# Patient Record
Sex: MALE | Employment: FULL TIME | URBAN - METROPOLITAN AREA
[De-identification: names, ages, dates, MRNs, and addresses within clinical notes are randomized per-mention and may not be internally consistent; named-entity substitution may affect disease eponyms.]

---

## 2019-03-27 ENCOUNTER — OFFICE VISIT (OUTPATIENT)
Dept: PEDIATRICS | Facility: CLINIC | Age: 59
End: 2019-03-27
Payer: COMMERCIAL

## 2019-03-27 VITALS
DIASTOLIC BLOOD PRESSURE: 90 MMHG | SYSTOLIC BLOOD PRESSURE: 160 MMHG | WEIGHT: 199.7 LBS | OXYGEN SATURATION: 97 % | TEMPERATURE: 96.9 F | HEART RATE: 83 BPM

## 2019-03-27 DIAGNOSIS — G47.00 INSOMNIA, UNSPECIFIED TYPE: ICD-10-CM

## 2019-03-27 DIAGNOSIS — I25.10 CORONARY ARTERY DISEASE INVOLVING NATIVE CORONARY ARTERY OF NATIVE HEART WITHOUT ANGINA PECTORIS: ICD-10-CM

## 2019-03-27 DIAGNOSIS — I10 ESSENTIAL HYPERTENSION: Primary | ICD-10-CM

## 2019-03-27 PROCEDURE — 99203 OFFICE O/P NEW LOW 30 MIN: CPT | Performed by: FAMILY MEDICINE

## 2019-03-27 RX ORDER — ATORVASTATIN CALCIUM 40 MG/1
40 TABLET, FILM COATED ORAL DAILY
Qty: 90 TABLET | Refills: 1 | Status: SHIPPED | OUTPATIENT
Start: 2019-03-27 | End: 2022-02-24

## 2019-03-27 RX ORDER — HYDROXYZINE PAMOATE 50 MG/1
50 CAPSULE ORAL
Qty: 30 CAPSULE | Refills: 1 | Status: SHIPPED | OUTPATIENT
Start: 2019-03-27 | End: 2022-02-24

## 2019-03-27 RX ORDER — METOPROLOL SUCCINATE 25 MG/1
TABLET, EXTENDED RELEASE ORAL
Qty: 270 TABLET | Refills: 1 | Status: SHIPPED | OUTPATIENT
Start: 2019-03-27 | End: 2020-12-31

## 2019-03-27 RX ORDER — AMLODIPINE BESYLATE 10 MG/1
10 TABLET ORAL DAILY
Qty: 90 TABLET | Refills: 1 | Status: SHIPPED | OUTPATIENT
Start: 2019-03-27 | End: 2022-02-24

## 2019-03-27 RX ORDER — HYDROCHLOROTHIAZIDE 50 MG/1
25 TABLET ORAL DAILY
Qty: 45 TABLET | Refills: 1 | Status: SHIPPED | OUTPATIENT
Start: 2019-03-27 | End: 2020-12-31

## 2019-03-27 NOTE — PROGRESS NOTES
CHIEF COMPLAINT    Needs refills.  H/O high BP      HISTORY    He used to be with [facility].    He has h/o BP, CAD - stent in LAD. Used to smoke.    He is feeling weaker and has higher BP in afternoons.    I went over his meds. Reviewed Care everywhere.      Patient Active Problem List   Diagnosis     Essential hypertension     Coronary artery disease involving native coronary artery of native heart without angina pectoris         Current Outpatient Medications   Medication Sig Dispense Refill     amLODIPine (NORVASC) 10 MG tablet Take 1 tablet (10 mg) by mouth daily 90 tablet 1     ASPIRIN 81 PO Take by mouth daily       atorvastatin (LIPITOR) 40 MG tablet Take 1 tablet (40 mg) by mouth daily 90 tablet 1     hydrochlorothiazide (HYDRODIURIL) 50 MG tablet Take 0.5 tablets (25 mg) by mouth daily 45 tablet 1     hydrOXYzine (VISTARIL) 50 MG capsule Take 1 capsule (50 mg) by mouth every evening as needed (sleep) 30 capsule 1     metoprolol succinate ER (TOPROL-XL) 25 MG 24 hr tablet Two in morning and 1 in evening. 270 tablet 1         REVIEW OF SYSTEMS    No angina or edema. No palpitation.  No SOB  No abd pain  Insomnia        History reviewed. No pertinent past medical history.      EXAM  /90 (BP Location: Right arm, Patient Position: Chair, Cuff Size: Adult Regular)   Pulse 83   Temp 96.9  F (36.1  C) (Tympanic)   Wt 90.6 kg (199 lb 11.2 oz)   SpO2 97%     Thin, NAD  HEENT neg  Neck no mass  Chest breathing non labored  CVC RSR  Abd non distended  Extrems no edema      (I10) Essential hypertension  (primary encounter diagnosis)  Comment:   Plan: hydrochlorothiazide (HYDRODIURIL) 50 MG tablet,        amLODIPine (NORVASC) 10 MG tablet, metoprolol         succinate ER (TOPROL-XL) 25 MG 24 hr tablet          Increased the AM Metoprolol.  F/U advised. Lab.    (I25.10) Coronary artery disease involving native coronary artery of native heart without angina pectoris  Comment:   Plan: atorvastatin (LIPITOR) 40  MG tablet            (G47.00) Insomnia, unspecified type  Comment:   Plan: hydrOXYzine (VISTARIL) 50 MG capsule

## 2020-12-15 ENCOUNTER — TRANSFERRED RECORDS (OUTPATIENT)
Dept: HEALTH INFORMATION MANAGEMENT | Facility: CLINIC | Age: 60
End: 2020-12-15

## 2020-12-31 ENCOUNTER — OFFICE VISIT (OUTPATIENT)
Dept: CARDIOLOGY | Facility: CLINIC | Age: 60
End: 2020-12-31
Payer: COMMERCIAL

## 2020-12-31 VITALS
SYSTOLIC BLOOD PRESSURE: 129 MMHG | HEART RATE: 88 BPM | WEIGHT: 186 LBS | DIASTOLIC BLOOD PRESSURE: 89 MMHG | HEIGHT: 74 IN | BODY MASS INDEX: 23.87 KG/M2

## 2020-12-31 DIAGNOSIS — I25.10 CORONARY ARTERY DISEASE INVOLVING NATIVE CORONARY ARTERY OF NATIVE HEART WITHOUT ANGINA PECTORIS: ICD-10-CM

## 2020-12-31 DIAGNOSIS — I10 ESSENTIAL HYPERTENSION: Primary | ICD-10-CM

## 2020-12-31 DIAGNOSIS — R55 VASOVAGAL SYNCOPE: ICD-10-CM

## 2020-12-31 DIAGNOSIS — E78.5 HYPERLIPIDEMIA LDL GOAL <70: ICD-10-CM

## 2020-12-31 PROCEDURE — 99204 OFFICE O/P NEW MOD 45 MIN: CPT | Performed by: INTERNAL MEDICINE

## 2020-12-31 PROCEDURE — 93000 ELECTROCARDIOGRAM COMPLETE: CPT | Performed by: INTERNAL MEDICINE

## 2020-12-31 RX ORDER — ESCITALOPRAM OXALATE 20 MG/1
20 TABLET ORAL DAILY
COMMUNITY
End: 2022-02-24

## 2020-12-31 RX ORDER — TRAZODONE HYDROCHLORIDE 150 MG/1
150 TABLET ORAL AT BEDTIME
COMMUNITY
End: 2022-02-24

## 2020-12-31 RX ORDER — NALTREXONE HYDROCHLORIDE 50 MG/1
50 TABLET, FILM COATED ORAL DAILY
COMMUNITY
End: 2022-02-24

## 2020-12-31 RX ORDER — CARVEDILOL 12.5 MG/1
12.5 TABLET ORAL 2 TIMES DAILY WITH MEALS
Qty: 60 TABLET | Refills: 11 | Status: SHIPPED | OUTPATIENT
Start: 2020-12-31 | End: 2022-02-24

## 2020-12-31 RX ORDER — MAGNESIUM 200 MG
1000 TABLET ORAL DAILY
COMMUNITY
End: 2022-02-24

## 2020-12-31 ASSESSMENT — MIFFLIN-ST. JEOR: SCORE: 1713.44

## 2020-12-31 NOTE — PROGRESS NOTES
HPI and Plan:   I had the pleasure of seeing Roman Corona in cardiology clinic for consultation regarding his hypertension.    He is originally from [place].  He has history of coronary artery disease.  He had a stent to the mid LAD in 2015 at [facility].  At that he had a small OM branch which had an 80% stenosis which was managed medically.  Prior to that, he had abnormal stress test with LAD ischemia.  Since then, he has been free of chest pain.  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    He made this appointment because of elevated blood pressures that have been noted over the last 2 months at home.  He has a home blood pressure instrument which he thinks is accurate.  It has been demonstrating high blood pressure in 1 6170 systolic in the morning and at night time.  He is already on metoprolol XL 50 mg twice daily.  This was increased recently from 50 mg in the morning and 25 g in the evening.  He is also on losartan hydrochlorothiazide 50/12.5 mg daily and amlodipine 10 mg daily.  He claims to be following a low-salt diet.  He also participates in physical activity including walking and has had no chest pain or shortness of breath.  Over the last 2 months, he is also had episodes of lightheadedness and occasional episodes of passing out.  It occurs when he is upright and he feels dizzy and sometimes he can able to abort these episodes by sitting down but on occasions he has passed out briefly.  He has taken blood pressures after passing out and blood pressures are actually higher.    He remains on a statin.  His primary care physician did some labs recently but not BMP or lipid profile.  ALT AST was normal.  He has not had any cardiac evaluation since 2015.    EKG done today reveals sinus rhythm without any ischemic changes.    Physical exam  See below    Impression    1.  Hypertension  Patient has been noticing elevated blood pressures  at home.  Here blood pressure is borderline with diastolic of 89 and systolic 129.  However, his home blood pressure instrument has noted high readings including this morning before he took his pills.  I suggested that which switch his metoprolol to Coreg 12.5 mg twice daily it is may be better blood pressure lowering agent as it also has alpha blocking action.  I have also asked him to come back and visit with my midlevel provider so that we can recheck his blood pressure and also check his blood pressure machine whether it is accurate or not.  He will bring it along with him.  In addition, I have asked him to continue to follow a low-salt diet.    2.  Coronary artery disease with previous mid LAD stent  Continue aspirin.  I offered him stress test to reassess ischemic burden of myocardium especially given episodes of dizziness and syncope.  He had a bad experience with stress test in the past and is categorically declining it.  I offered him a pharmacological stress test as his experience was bad with this exercise stress test but he still declines it.  He has agreed to do an echocardiogram to assess wall motion and ejection fraction.    3.  Syncope  This could be vasovagal.  If it persists, we may consider doing an Zio patch monitor to make sure there is no arrhythmia.  He has not complained of any palpitations.  I would recommend checking his orthostasis at the next visit.  I wonder whether mild antihypertensives is causing this lightheadedness.  Again, accuracy of his home blood pressure instrument needs to be confirmed.    4.  Hyperlipidemia, continue atorvastatin.  Will check lipid profile at next visit when he sees my midlevel provider    He lives in [place] and will see our midlevel provider in [facility].  However, subsequently wants to see her cardiologist that visits at the [place] office and I would suggest that he make an appointment Dr. Brown or Dr. Dodson at that time at the [place] office.    Thank  you for allowing us to participate in the care of this nice patient.    Sincerely,    Abdulaziz Diane MD      Orders Placed This Encounter   Procedures     Basic metabolic panel     Lipid Profile     ALT     Follow-Up with Cardiac Advanced Practice Provider     EKG 12-lead complete w/read - Clinics (performed today)     Echocardiogram Complete       Orders Placed This Encounter   Medications     cyanocobalamin (VITAMIN B-12) 1000 MCG SUBL sublingual tablet     Sig: Place 1,000 mcg under the tongue daily     escitalopram (LEXAPRO) 20 MG tablet     Sig: Take 20 mg by mouth daily     losartan 50 MG TABS 50 mg, hydrochlorothiazide 12.5 MG CAPS 12.5 mg     Sig: Take by mouth daily     naltrexone (DEPADE/REVIA) 50 MG tablet     Sig: Take 50 mg by mouth daily     traZODone (DESYREL) 150 MG tablet     Sig: Take 150 mg by mouth At Bedtime PRN     carvedilol (COREG) 12.5 MG tablet     Sig: Take 1 tablet (12.5 mg) by mouth 2 times daily (with meals)     Dispense:  60 tablet     Refill:  11       Medications Discontinued During This Encounter   Medication Reason     hydrochlorothiazide (HYDRODIURIL) 50 MG tablet Stopped by Patient     metoprolol succinate ER (TOPROL-XL) 25 MG 24 hr tablet          Encounter Diagnoses   Name Primary?     Essential hypertension Yes     Coronary artery disease involving native coronary artery of native heart without angina pectoris      Hyperlipidemia LDL goal <70      Vasovagal syncope        CURRENT MEDICATIONS:  Current Outpatient Medications   Medication Sig Dispense Refill     amLODIPine (NORVASC) 10 MG tablet Take 1 tablet (10 mg) by mouth daily 90 tablet 1     ASPIRIN 81 PO Take by mouth daily       atorvastatin (LIPITOR) 40 MG tablet Take 1 tablet (40 mg) by mouth daily 90 tablet 1     carvedilol (COREG) 12.5 MG tablet Take 1 tablet (12.5 mg) by mouth 2 times daily (with meals) 60 tablet 11     cyanocobalamin (VITAMIN B-12) 1000 MCG SUBL sublingual tablet Place 1,000 mcg under the tongue  daily       escitalopram (LEXAPRO) 20 MG tablet Take 20 mg by mouth daily       losartan 50 MG TABS 50 mg, hydrochlorothiazide 12.5 MG CAPS 12.5 mg Take by mouth daily       naltrexone (DEPADE/REVIA) 50 MG tablet Take 50 mg by mouth daily       traZODone (DESYREL) 150 MG tablet Take 150 mg by mouth At Bedtime PRN       hydrOXYzine (VISTARIL) 50 MG capsule Take 1 capsule (50 mg) by mouth every evening as needed (sleep) (Patient not taking: Reported on 12/31/2020) 30 capsule 1       ALLERGIES     Allergies   Allergen Reactions     Ace Inhibitors      rash     Melatonin      Shortness of breath       PAST MEDICAL HISTORY:  Past Medical History:   Diagnosis Date     Depression      ETOH abuse      Hyperlipidemia LDL goal <70        PAST SURGICAL HISTORY:  Past Surgical History:   Procedure Laterality Date     ANGIOPLASTY  03/30/2015    MID LAD stent       FAMILY HISTORY:  Family History   Problem Relation Age of Onset     Hypertension Mother        SOCIAL HISTORY:  Social History     Socioeconomic History     Marital status:      Spouse name: None     Number of children: None     Years of education: None     Highest education level: None   Occupational History     None   Social Needs     Financial resource strain: None     Food insecurity     Worry: None     Inability: None     Transportation needs     Medical: None     Non-medical: None   Tobacco Use     Smoking status: Current Every Day Smoker     Years: 28.00     Types: Cigarettes     Smokeless tobacco: Never Used     Tobacco comment: smokes 3-4 cig day,   Substance and Sexual Activity     Alcohol use: Not Currently     Drug use: None     Sexual activity: None   Lifestyle     Physical activity     Days per week: None     Minutes per session: None     Stress: None   Relationships     Social connections     Talks on phone: None     Gets together: None     Attends Restorationist service: None     Active member of club or organization: None     Attends meetings of  "clubs or organizations: None     Relationship status: None     Intimate partner violence     Fear of current or ex partner: None     Emotionally abused: None     Physically abused: None     Forced sexual activity: None   Other Topics Concern     Parent/sibling w/ CABG, MI or angioplasty before 65F 55M? Not Asked   Social History Narrative     None       Review of Systems:  Skin:  Negative       Eyes:  Positive for glasses;visual blurring    ENT:  Negative      Respiratory:  Positive for dyspnea on exertion     Cardiovascular:  Negative;chest pain Positive for;dizziness;palpitations;lower extremity symptoms;fatigue;exercise intolerance near syncope, edema in feet  Gastroenterology: Negative      Genitourinary:  Negative      Musculoskeletal:  Positive for   balance off, recent falls  Neurologic:  Positive for numbness or tingling of feet;headaches    Psychiatric:  Positive for sleep disturbances    Heme/Lymph/Imm:  Positive for weight loss    Endocrine:  Negative        Physical Exam:  Vitals: /89   Pulse 88   Ht 1.88 m (6' 2\")   Wt 84.4 kg (186 lb)   BMI 23.88 kg/m      Constitutional:  in no acute distress        Skin:  warm and dry to the touch          Head:  normocephalic        Eyes:  sclera white        Lymph:      ENT:  no pallor or cyanosis        Neck:  JVP normal        Respiratory:  clear to auscultation         Cardiac: regular rhythm;normal S1 and S2     no presence of murmur        S4+                                           GI:  not assessed this visit        Extremities and Muscular Skeletal:  no edema              Neurological:  no gross motor deficits        Psych:  Alert and Oriented x 3        CC  No referring provider defined for this encounter.              "

## 2022-02-24 ENCOUNTER — OFFICE VISIT (OUTPATIENT)
Dept: INTERNAL MEDICINE | Facility: CLINIC | Age: 62
End: 2022-02-24
Payer: COMMERCIAL

## 2022-02-24 ENCOUNTER — ANCILLARY PROCEDURE (OUTPATIENT)
Dept: GENERAL RADIOLOGY | Facility: CLINIC | Age: 62
End: 2022-02-24
Attending: FAMILY MEDICINE
Payer: COMMERCIAL

## 2022-02-24 VITALS
BODY MASS INDEX: 24.38 KG/M2 | HEIGHT: 74 IN | SYSTOLIC BLOOD PRESSURE: 122 MMHG | HEART RATE: 108 BPM | DIASTOLIC BLOOD PRESSURE: 96 MMHG | WEIGHT: 190 LBS | OXYGEN SATURATION: 100 %

## 2022-02-24 DIAGNOSIS — I25.10 CORONARY ARTERY DISEASE INVOLVING NATIVE CORONARY ARTERY OF NATIVE HEART WITHOUT ANGINA PECTORIS: ICD-10-CM

## 2022-02-24 DIAGNOSIS — R42 DIZZINESS: ICD-10-CM

## 2022-02-24 DIAGNOSIS — I10 ESSENTIAL HYPERTENSION: ICD-10-CM

## 2022-02-24 DIAGNOSIS — R05.9 COUGH: ICD-10-CM

## 2022-02-24 DIAGNOSIS — R53.1 WEAKNESS: Primary | ICD-10-CM

## 2022-02-24 DIAGNOSIS — F51.01 PRIMARY INSOMNIA: ICD-10-CM

## 2022-02-24 DIAGNOSIS — F17.200 TOBACCO USE DISORDER: ICD-10-CM

## 2022-02-24 LAB
BASOPHILS # BLD AUTO: 0 10E3/UL (ref 0–0.2)
BASOPHILS NFR BLD AUTO: 0 %
EOSINOPHIL # BLD AUTO: 0.1 10E3/UL (ref 0–0.7)
EOSINOPHIL NFR BLD AUTO: 1 %
ERYTHROCYTE [DISTWIDTH] IN BLOOD BY AUTOMATED COUNT: 14 % (ref 10–15)
HCT VFR BLD AUTO: 41.1 % (ref 40–53)
HGB BLD-MCNC: 14.3 G/DL (ref 13.3–17.7)
LYMPHOCYTES # BLD AUTO: 1.6 10E3/UL (ref 0.8–5.3)
LYMPHOCYTES NFR BLD AUTO: 30 %
MCH RBC QN AUTO: 32.6 PG (ref 26.5–33)
MCHC RBC AUTO-ENTMCNC: 34.8 G/DL (ref 31.5–36.5)
MCV RBC AUTO: 94 FL (ref 78–100)
MONOCYTES # BLD AUTO: 0.9 10E3/UL (ref 0–1.3)
MONOCYTES NFR BLD AUTO: 17 %
NEUTROPHILS # BLD AUTO: 2.8 10E3/UL (ref 1.6–8.3)
NEUTROPHILS NFR BLD AUTO: 52 %
PLATELET # BLD AUTO: 206 10E3/UL (ref 150–450)
RBC # BLD AUTO: 4.38 10E6/UL (ref 4.4–5.9)
WBC # BLD AUTO: 5.4 10E3/UL (ref 4–11)

## 2022-02-24 PROCEDURE — 80050 GENERAL HEALTH PANEL: CPT | Performed by: FAMILY MEDICINE

## 2022-02-24 PROCEDURE — 36415 COLL VENOUS BLD VENIPUNCTURE: CPT | Performed by: FAMILY MEDICINE

## 2022-02-24 PROCEDURE — 80061 LIPID PANEL: CPT | Performed by: FAMILY MEDICINE

## 2022-02-24 PROCEDURE — 71046 X-RAY EXAM CHEST 2 VIEWS: CPT | Performed by: RADIOLOGY

## 2022-02-24 PROCEDURE — 99214 OFFICE O/P EST MOD 30 MIN: CPT | Performed by: FAMILY MEDICINE

## 2022-02-24 RX ORDER — TRAZODONE HYDROCHLORIDE 50 MG/1
50 TABLET, FILM COATED ORAL AT BEDTIME
Qty: 90 TABLET | Refills: 1 | Status: SHIPPED | OUTPATIENT
Start: 2022-02-24 | End: 2022-03-10

## 2022-02-24 RX ORDER — AMLODIPINE BESYLATE 10 MG/1
10 TABLET ORAL DAILY
Qty: 90 TABLET | Refills: 1 | Status: SHIPPED | OUTPATIENT
Start: 2022-02-24

## 2022-02-24 RX ORDER — METOPROLOL SUCCINATE 25 MG/1
25 TABLET, EXTENDED RELEASE ORAL DAILY
Status: CANCELLED | COMMUNITY
Start: 2022-02-24

## 2022-02-24 RX ORDER — HYDROCHLOROTHIAZIDE 50 MG/1
100 TABLET ORAL EVERY OTHER DAY
Status: CANCELLED | OUTPATIENT
Start: 2022-02-24

## 2022-02-24 RX ORDER — ATORVASTATIN CALCIUM 40 MG/1
40 TABLET, FILM COATED ORAL DAILY
Qty: 90 TABLET | Refills: 1 | Status: SHIPPED | OUTPATIENT
Start: 2022-02-24

## 2022-02-24 RX ORDER — METOPROLOL SUCCINATE 25 MG/1
TABLET, EXTENDED RELEASE ORAL
Qty: 270 TABLET | Refills: 1 | Status: SHIPPED | OUTPATIENT
Start: 2022-02-24

## 2022-02-24 NOTE — PROGRESS NOTES
Son  [phone]      (R53.1) Weakness  (primary encounter diagnosis)  Comment:     Suspicion that much of this is alcohol related.    Plan: Comprehensive metabolic panel (BMP + Alb, Alk         Phos, ALT, AST, Total. Bili, TP), CBC with         platelets and differential, TSH with free T4         reflex        Patient feels he can abstain.    (R42) Dizziness  Comment:   Plan: Comprehensive metabolic panel (BMP + Alb, Alk         Phos, ALT, AST, Total. Bili, TP), TSH with free        T4 reflex            (R05.9) Cough  Comment:   Plan: XR Chest 2 Views            (I10) Essential hypertension  Comment:   Plan: amLODIPine (NORVASC) 10 MG tablet, metoprolol         succinate ER (TOPROL-XL) 25 MG 24 hr tablet            (I25.10) Coronary artery disease involving native coronary artery of native heart without angina pectoris  Comment:   Plan: Lipid panel reflex to direct LDL Non-fasting,         atorvastatin (LIPITOR) 40 MG tablet            (F17.200) Tobacco use disorder  Comment:   Plan:     (F51.01) Primary insomnia  Comment:   Plan: traZODone (DESYREL) 50 MG tablet                  Subjective     61-year-old man here with his son.  Concerns.    HPI     Generalized body weakness, dizziness, insomnia, nausea and weight loss started 5-2021.    Complains of some symptoms dating back to May 2021.  Complains of weakness and dizziness.  At times confused.  Has decreased appetite.    Patient apparently drinks alcohol, at least 5 drinks per day and son indicates that he probably drinks more.  He currently has been abstaining for 3 to 4 days and thinks he can keep this up.    Apparently lives by himself.    Has history of CAD, patient is a smoker.  Has hypertension.    Needs some refills.      Current Outpatient Medications   Medication Sig Dispense Refill     amLODIPine (NORVASC) 10 MG tablet Take 1 tablet (10 mg) by mouth daily 90 tablet 1     ASPIRIN 81 PO Take by mouth daily       atorvastatin (LIPITOR) 40 MG tablet  "Take 1 tablet (40 mg) by mouth daily 90 tablet 1     losartan 50 MG TABS 50 mg, hydrochlorothiazide 12.5 MG CAPS 12.5 mg Take by mouth daily       metoprolol succinate ER (TOPROL-XL) 25 MG 24 hr tablet 2 in AM and 1 in  tablet 1     traZODone (DESYREL) 50 MG tablet Take 1 tablet (50 mg) by mouth At Bedtime 90 tablet 1       Patient Active Problem List   Diagnosis     Essential hypertension     Coronary artery disease involving native coronary artery of native heart without angina pectoris     Hyperlipidemia LDL goal <70     Vasovagal syncope     Tobacco use disorder         Review of Systems     No fever or malaise.  No chest pain.  No syncope.  No vomiting or diarrhea.  No abdominal pain.  No extremity edema.  No focal weakness.  No tremor.  No jaundice.          Objective    BP (!) 122/96 (BP Location: Right arm, Patient Position: Chair, Cuff Size: Adult Large)   Pulse 108   Ht 1.88 m (6' 2\")   Wt 86.2 kg (190 lb)   SpO2 100%   BMI 24.39 kg/m    There is no height or weight on file to calculate BMI.  Physical Exam     Alert.  Appears a bit older than stated age.  HEENT no scleral icterus.  No neck masses or thyromegaly.  Lungs occasional rhonchi.  No wheezing.  No labored breathing.  Cardiac RSR without murmur.  Abdomen no mass or tenderness.  Extremities no edema.  No apparent motor weakness.  No tremor.    "

## 2022-02-24 NOTE — LETTER
March 9, 2022      Arash Corona  [address]  [place] [address]        Mateus Michelgey,     Your labs show some electrolyte problems with sodium and potassium and you have mildly abnormal liver function.     If you stayed away from alcohol and been eating more normally?     We were planning a return visit.  Please have another appointment to see how you are doing.            Sincerely,      Reese Kern MD      Results for orders placed or performed in visit on 02/24/22   XR Chest 2 Views     Status: None    Narrative    CHEST TWO VIEWS 2/24/2022 4:37 PM     HISTORY: Cough    COMPARISON: None.    FINDINGS: Heart size and pulmonary vascularity are within normal  limits. The lungs are clear. No pneumothorax or pleural effusion.       Impression    IMPRESSION: No radiographic evidence of acute chest abnormality.     LD BRADLEY MD         SYSTEM ID:  [id number]   Results for orders placed or performed in visit on 02/24/22   Comprehensive metabolic panel (BMP + Alb, Alk Phos, ALT, AST, Total. Bili, TP)     Status: Abnormal   Result Value Ref Range    Sodium 130 (L) 133 - 144 mmol/L    Potassium 2.9 (L) 3.4 - 5.3 mmol/L    Chloride 88 (L) 94 - 109 mmol/L    Carbon Dioxide (CO2) 30 20 - 32 mmol/L    Anion Gap 12 3 - 14 mmol/L    Urea Nitrogen 8 7 - 30 mg/dL    Creatinine 1.04 0.66 - 1.25 mg/dL    Calcium 9.3 8.5 - 10.1 mg/dL    Glucose 139 (H) 70 - 99 mg/dL    Alkaline Phosphatase 58 40 - 150 U/L    AST 60 (H) 0 - 45 U/L    ALT 54 0 - 70 U/L    Protein Total 7.3 6.8 - 8.8 g/dL    Albumin 3.0 (L) 3.4 - 5.0 g/dL    Bilirubin Total 0.4 0.2 - 1.3 mg/dL    GFR Estimate 81 >60 mL/min/1.73m2   TSH with free T4 reflex     Status: Normal   Result Value Ref Range    TSH 1.52 0.40 - 4.00 mU/L   Lipid panel reflex to direct LDL Non-fasting     Status: None   Result Value Ref Range    Cholesterol 184 <200 mg/dL    Triglycerides 104 <150 mg/dL    Direct Measure HDL 66 >=40 mg/dL    LDL Cholesterol Calculated 97 <=100  mg/dL    Non HDL Cholesterol 118 <130 mg/dL    Patient Fasting > 8hrs? No     Narrative    Cholesterol  Desirable:  <200 mg/dL    Triglycerides  Normal:  Less than 150 mg/dL  Borderline High:  150-199 mg/dL  High:  200-499 mg/dL  Very High:  Greater than or equal to 500 mg/dL    Direct Measure HDL  Female:  Greater than or equal to 50 mg/dL   Male:  Greater than or equal to 40 mg/dL    LDL Cholesterol  Desirable:  <100mg/dL  Above Desirable:  100-129 mg/dL   Borderline High:  130-159 mg/dL   High:  160-189 mg/dL   Very High:  >= 190 mg/dL    Non HDL Cholesterol  Desirable:  130 mg/dL  Above Desirable:  130-159 mg/dL  Borderline High:  160-189 mg/dL  High:  190-219 mg/dL  Very High:  Greater than or equal to 220 mg/dL   CBC with platelets and differential     Status: Abnormal   Result Value Ref Range    WBC Count 5.4 4.0 - 11.0 10e3/uL    RBC Count 4.38 (L) 4.40 - 5.90 10e6/uL    Hemoglobin 14.3 13.3 - 17.7 g/dL    Hematocrit 41.1 40.0 - 53.0 %    MCV 94 78 - 100 fL    MCH 32.6 26.5 - 33.0 pg    MCHC 34.8 31.5 - 36.5 g/dL    RDW 14.0 10.0 - 15.0 %    Platelet Count 206 150 - 450 10e3/uL    % Neutrophils 52 %    % Lymphocytes 30 %    % Monocytes 17 %    % Eosinophils 1 %    % Basophils 0 %    Absolute Neutrophils 2.8 1.6 - 8.3 10e3/uL    Absolute Lymphocytes 1.6 0.8 - 5.3 10e3/uL    Absolute Monocytes 0.9 0.0 - 1.3 10e3/uL    Absolute Eosinophils 0.1 0.0 - 0.7 10e3/uL    Absolute Basophils 0.0 0.0 - 0.2 10e3/uL   CBC with platelets and differential     Status: Abnormal    Narrative    The following orders were created for panel order CBC with platelets and differential.  Procedure                               Abnormality         Status                     ---------                               -----------         ------                     CBC with platelets and d...[[id number]]  Abnormal            Final result                 Please view results for these tests on the individual orders.

## 2022-02-24 NOTE — PATIENT INSTRUCTIONS
Stop alcohol.    Eat regularly.    Take multi vitamin.      See Dr Kern in 2 weeks for re check.

## 2022-02-26 LAB
ALBUMIN SERPL-MCNC: 3 G/DL (ref 3.4–5)
ALP SERPL-CCNC: 58 U/L (ref 40–150)
ALT SERPL W P-5'-P-CCNC: 54 U/L (ref 0–70)
ANION GAP SERPL CALCULATED.3IONS-SCNC: 12 MMOL/L (ref 3–14)
AST SERPL W P-5'-P-CCNC: 60 U/L (ref 0–45)
BILIRUB SERPL-MCNC: 0.4 MG/DL (ref 0.2–1.3)
BUN SERPL-MCNC: 8 MG/DL (ref 7–30)
CALCIUM SERPL-MCNC: 9.3 MG/DL (ref 8.5–10.1)
CHLORIDE BLD-SCNC: 88 MMOL/L (ref 94–109)
CHOLEST SERPL-MCNC: 184 MG/DL
CO2 SERPL-SCNC: 30 MMOL/L (ref 20–32)
CREAT SERPL-MCNC: 1.04 MG/DL (ref 0.66–1.25)
FASTING STATUS PATIENT QL REPORTED: NO
GFR SERPL CREATININE-BSD FRML MDRD: 81 ML/MIN/1.73M2
GLUCOSE BLD-MCNC: 139 MG/DL (ref 70–99)
HDLC SERPL-MCNC: 66 MG/DL
LDLC SERPL CALC-MCNC: 97 MG/DL
NONHDLC SERPL-MCNC: 118 MG/DL
POTASSIUM BLD-SCNC: 2.9 MMOL/L (ref 3.4–5.3)
PROT SERPL-MCNC: 7.3 G/DL (ref 6.8–8.8)
SODIUM SERPL-SCNC: 130 MMOL/L (ref 133–144)
TRIGL SERPL-MCNC: 104 MG/DL
TSH SERPL DL<=0.005 MIU/L-ACNC: 1.52 MU/L (ref 0.4–4)

## 2022-03-10 ENCOUNTER — OFFICE VISIT (OUTPATIENT)
Dept: INTERNAL MEDICINE | Facility: CLINIC | Age: 62
End: 2022-03-10
Payer: COMMERCIAL

## 2022-03-10 VITALS
RESPIRATION RATE: 18 BRPM | SYSTOLIC BLOOD PRESSURE: 129 MMHG | HEART RATE: 99 BPM | TEMPERATURE: 97.2 F | DIASTOLIC BLOOD PRESSURE: 91 MMHG | HEIGHT: 74 IN | BODY MASS INDEX: 25.41 KG/M2 | OXYGEN SATURATION: 98 % | WEIGHT: 198 LBS

## 2022-03-10 DIAGNOSIS — E87.6 HYPOKALEMIA: ICD-10-CM

## 2022-03-10 DIAGNOSIS — I10 ESSENTIAL HYPERTENSION: ICD-10-CM

## 2022-03-10 DIAGNOSIS — R42 VERTIGO: Primary | ICD-10-CM

## 2022-03-10 DIAGNOSIS — F51.01 PRIMARY INSOMNIA: ICD-10-CM

## 2022-03-10 DIAGNOSIS — G47.00 INSOMNIA, UNSPECIFIED TYPE: ICD-10-CM

## 2022-03-10 DIAGNOSIS — E87.1 HYPONATREMIA: ICD-10-CM

## 2022-03-10 LAB
ANION GAP SERPL CALCULATED.3IONS-SCNC: 4 MMOL/L (ref 3–14)
BUN SERPL-MCNC: 24 MG/DL (ref 7–30)
CALCIUM SERPL-MCNC: 9.2 MG/DL (ref 8.5–10.1)
CHLORIDE BLD-SCNC: 107 MMOL/L (ref 94–109)
CO2 SERPL-SCNC: 28 MMOL/L (ref 20–32)
CREAT SERPL-MCNC: 1.01 MG/DL (ref 0.66–1.25)
GFR SERPL CREATININE-BSD FRML MDRD: 84 ML/MIN/1.73M2
GLUCOSE BLD-MCNC: 91 MG/DL (ref 70–99)
POTASSIUM BLD-SCNC: 3.9 MMOL/L (ref 3.4–5.3)
SODIUM SERPL-SCNC: 139 MMOL/L (ref 133–144)

## 2022-03-10 PROCEDURE — 36415 COLL VENOUS BLD VENIPUNCTURE: CPT | Performed by: FAMILY MEDICINE

## 2022-03-10 PROCEDURE — 80048 BASIC METABOLIC PNL TOTAL CA: CPT | Performed by: FAMILY MEDICINE

## 2022-03-10 PROCEDURE — 99214 OFFICE O/P EST MOD 30 MIN: CPT | Performed by: FAMILY MEDICINE

## 2022-03-10 RX ORDER — POTASSIUM CHLORIDE 1500 MG/1
20 TABLET, EXTENDED RELEASE ORAL DAILY
Qty: 30 TABLET | Refills: 2 | Status: SHIPPED | OUTPATIENT
Start: 2022-03-10 | End: 2022-07-03

## 2022-03-10 RX ORDER — CLONAZEPAM 1 MG/1
1 TABLET ORAL
Qty: 30 TABLET | Refills: 1 | Status: SHIPPED | OUTPATIENT
Start: 2022-03-10 | End: 2022-07-03

## 2022-03-10 NOTE — PROGRESS NOTES
"  (R42) Vertigo  (primary encounter diagnosis)  Comment:   Complains of episodic vertigo spells along with falling.  Gets them about daily since May 2021.  Plan: Adult Neurology  Referral            (I10) Essential hypertension  Comment:   Plan: Basic metabolic panel  (Ca, Cl, CO2, Creat,         Gluc, K, Na, BUN)            (E87.6) Hypokalemia  Comment:   Plan: potassium chloride ER (KLOR-CON M) 20 MEQ CR         tablet, Basic metabolic panel  (Ca, Cl, CO2,         Creat, Gluc, K, Na, BUN)            (E87.1) Hyponatremia  Comment:   Plan: Basic metabolic panel  (Ca, Cl, CO2, Creat,         Gluc, K, Na, BUN)            (G47.00) Insomnia, unspecified type  Comment:   Plan:     (F51.01) Primary insomnia  Comment:   Plan: clonazePAM (KLONOPIN) 1 MG tablet                Jonny Antoine is a 61 year old who presents for the following health issues  accompanied by his son.    HPI     Follow up.    Previous note reviewed.    Patient had presented with weakness and dizziness.  He was apparently using quite a bit of alcohol.  He has now stopped the alcohol.  Says he drinks a lot of tea and some punch other than actual juices.    History is clarified to some extent.  He now complains more of episodic spells including vertigo and sometimes falling down when this happens.  He has to hold onto objects to stay upright.  This is been going on since back in May 2021.    He is back taking his blood pressure meds and we clarified these.    He has insomnia and did not get any benefit from 1 or 2 trazodone at night.        Review of Systems     No fever.  No short of breath.  No heart palpitations.  No chest pain.  Some reflux symptoms.  No localized weakness.  Occasional headache.  No vision problems.          Objective    BP (!) 129/91 (BP Location: Right arm, Patient Position: Sitting, Cuff Size: Adult Regular)   Pulse 99   Temp 97.2  F (36.2  C) (Tympanic)   Resp 18   Ht 1.88 m (6' 2\")   Wt 89.8 kg (198 lb)   " SpO2 98%   BMI 25.42 kg/m    There is no height or weight on file to calculate BMI.  Physical Exam     PERRL.  I do not see significant horizontal or vertical nystagmus.  Face is symmetrical, speech clear.  Tympanic membranes appear normal.  Carotid pulsations 2+ without obvious bruits.  Nonlabored breathing.  He does finger-nose-finger test fairly normally.  Romberg negative.  Gait is independent although he bends forward a bit.

## 2022-04-04 ENCOUNTER — OFFICE VISIT (OUTPATIENT)
Dept: NEUROLOGY | Facility: CLINIC | Age: 62
End: 2022-04-04
Payer: COMMERCIAL

## 2022-04-04 VITALS
BODY MASS INDEX: 24.95 KG/M2 | HEIGHT: 74 IN | HEART RATE: 123 BPM | SYSTOLIC BLOOD PRESSURE: 137 MMHG | DIASTOLIC BLOOD PRESSURE: 74 MMHG | WEIGHT: 194.4 LBS

## 2022-04-04 DIAGNOSIS — R42 DIZZINESS: Primary | ICD-10-CM

## 2022-04-04 PROCEDURE — 99204 OFFICE O/P NEW MOD 45 MIN: CPT | Performed by: PSYCHIATRY & NEUROLOGY

## 2022-04-04 RX ORDER — MECLIZINE HYDROCHLORIDE 25 MG/1
25 TABLET ORAL 3 TIMES DAILY PRN
Qty: 60 TABLET | Refills: 1 | Status: SHIPPED | OUTPATIENT
Start: 2022-04-04

## 2022-04-04 NOTE — PROGRESS NOTES
"INITIAL NEUROLOGY CONSULTATION    DATE OF VISIT: 4/4/2022  MRN: [id number]  PATIENT NAME: Arash Corona  YOB: 1960    REFERRING PROVIDER: Reese Kern    Chief Complaint   Patient presents with     Vertigo     New patient -dizzy spells since last spring, it comes and goes. Patient fell yesterday due to dizzy spells.        SUBJECTIVE:                                                      HPI:   Arash Corona is a 61 year old male whom I have been asked by Dr. Kern to see in consultation for episodic vertigo.  He reported the spells, daily since May 2021 when he saw his primary doctor a few weeks ago.  Arash has additional history of hypertension, hyperlipidemia, CAD, hyponatremia, hypokalemia, insomnia (for which he is on Klonopin).  At his previous visit he was also complaining of some weakness.  He has history of heavy alcohol use which he had stopped.  With some of the vertigo episodes he was falling.  Occasional headache.  He does follow with cardiology outside of our system.    Arash is here with his son.   He says he falls unpredictably. He says he passes out at times with this. He feels weak thereafter.  No known abnormal movements.  He does not have any associated headache or pain.    Son has never seen one of the spells.  Certainly says that once in a while he loses consciousness but most of the time it is just a sensation of dizziness.  This can happen several times per day.  No definite triggers or anything that necessarily makes him feel better.  He does drink quite a bit of alcohol per son.  I asked Arash how much and he says \"I don't even know.\"  He has not noticed any changes in his hearing with the spells.  He has not noticed if turning over in bed triggers the dizziness.  He says he cannot really describe the feeling, not necessarily room spinning.  He says that his right foot is numb.    No family history of seizures.     Past Medical History:   Diagnosis Date     " "Depression      ETOH abuse      Hyperlipidemia LDL goal <70      Past Surgical History:   Procedure Laterality Date     ANGIOPLASTY  2015    MID LAD stent       amLODIPine (NORVASC) 10 MG tablet, Take 1 tablet (10 mg) by mouth daily  ASPIRIN 81 PO, Take by mouth daily  atorvastatin (LIPITOR) 40 MG tablet, Take 1 tablet (40 mg) by mouth daily  metoprolol succinate ER (TOPROL-XL) 25 MG 24 hr tablet, 2 in AM and 1 in PM  clonazePAM (KLONOPIN) 1 MG tablet, Take 1 tablet (1 mg) by mouth nightly as needed for anxiety or sleep (Patient not taking: Reported on 2022)  potassium chloride ER (KLOR-CON M) 20 MEQ CR tablet, Take 1 tablet (20 mEq) by mouth daily (Patient not taking: Reported on 2022)    No current facility-administered medications on file prior to visit.    Allergies   Allergen Reactions     Ace Inhibitors      rash     Melatonin      Shortness of breath        Problem (# of Occurrences) Relation (Name,Age of Onset)    Hypertension (1) Mother        Social History     Tobacco Use     Smoking status: Former Smoker     Years: 28.00     Types: Cigarettes     Quit date: 2021     Years since quittin.3     Smokeless tobacco: Never Used     Tobacco comment: smokes 3-4 cig day,   Substance Use Topics     Alcohol use: Yes     Comment: sometimes      Drug use: None       REVIEW OF SYSTEMS:                                                      10-point review of systems is negative except as mentioned above in HPI.     EXAM:                                                      Physical Exam:   Vitals: /74 (BP Location: Right arm, Patient Position: Sitting)   Pulse (!) 123   Ht 1.88 m (6' 2\")   Wt 88.2 kg (194 lb 6.4 oz)   BMI 24.96 kg/m    BMI= Body mass index is 24.96 kg/m .  GENERAL: NAD.  HEENT: NC/AT.   CV: RRR. S1, S2.   NECK: No bruits.  PULM: Non-labored breathing.   Neurologic:  MENTAL STATUS: Alert, attentive. Speech is fluent. Normal comprehension.  Normal concentration. Adequate fund " of knowledge.   CRANIAL NERVES: Discs technically difficult to visualize. Visual fields intact to confrontation. Pupils equally, round and reactive to light. Facial sensation and movement normal. EOM full, though I noticed that it is hard for him to do smooth pursuit and he says something about his peripheral vision. Hearing intact to conversation. Trapezius strength intact. Tongue midline.  MOTOR: 5/5 in proximal and distal muscle groups of upper and lower extremities. Tone and bulk normal.   DTRs: Intact and symmetric in biceps, BR, patellae (1+ throughout).  Babinski down-going bilaterally.   SENSATION: Normal light touch and pinprick in the hands.  Decreased light touch and pinprick in the right lower extremity compared to the left. Intact proprioception at the left great toe only. Vibration: Diminished at both ankles.   COORDINATION: Normal finger nose finger. Finger tapping normal. Knee heel shin normal.  STATION AND GAIT: Romberg negative. Good postural reflexes. Casual gait appears normal.  SPECIAL TESTS: Patient is extremely symptomatic sitting up with testing.  Cannot keep his eyes closed he is dizzy and nauseous for several minutes thereafter.  Left hand-dominant.    ASSESSMENT and PLAN:                                                      Assessment:     ICD-10-CM    1. Dizziness  R42 MR Brain w/o & w Contrast     EEG Routine     meclizine (ANTIVERT) 25 MG tablet     Physical Therapy Referral     Mr. Corona is a pleasant 61-year-old man previously from [place], here for episodic vertigo/dizziness.  He is seems a little bit hesitant about doing a work-up.  Son will make sure that the tests are completed.  I suggest we do brain imaging and an EEG and that we get him into physical therapy for vestibular evaluation.  In the meantime, I suggested we try meclizine for symptom management.  I would like to see Arash back in clinic after he works with PT and the tests are completed.    Plan:  -- Brain MRI to  look at things from a structural standpoint.  -- EEG/brainwave study to make sure there is not any underlying seizure activity to explain your symptoms.  -- Referral to physical therapy for vestibular (inner ear) evaluation.  -- Meclizine as needed for dizziness/nausea.  -- I do recommend you cut down on your alcohol use as this can contribute to dizziness/imbalance.  -- Return to Neurology clinic after the above evaluations are completed.    Total Time: 45 minutes were spent with the patient and in chart review/documentation (as described above in Assessment and Plan) /coordinating the care on date of service.    Mary Armstrong MD  Neurology    CC: Reese Kern MD    Dragon software used in the dictation of this note.

## 2022-04-04 NOTE — PATIENT INSTRUCTIONS
Plan:  -- Brain MRI to look at things from a structural standpoint.  -- EEG/brainwave study to make sure there is not any underlying seizure activity to explain your symptoms.  -- Referral to physical therapy for vestibular (inner ear) evaluation.  -- Meclizine as needed for dizziness/nausea.  -- I do recommend you cut down on your alcohol use as this can contribute to dizziness/imbalance.  -- Return to Neurology clinic after the above evaluations are completed.

## 2022-04-04 NOTE — LETTER
"    4/4/2022         RE: Arash Corona  [address]  [facility] [address]        Dear Colleague,    Thank you for referring your patient, Arash Corona, to the [facility] [place]. Please see a copy of my visit note below.    INITIAL NEUROLOGY CONSULTATION    DATE OF VISIT: 4/4/2022  MRN: [id number]  PATIENT NAME: Arash Corona  YOB: 1960    REFERRING PROVIDER: Reese Kern    Chief Complaint   Patient presents with     Vertigo     New patient -dizzy spells since last spring, it comes and goes. Patient fell yesterday due to dizzy spells.        SUBJECTIVE:                                                      HPI:   Arash Corona is a 61 year old male whom I have been asked by Dr. Kern to see in consultation for episodic vertigo.  He reported the spells, daily since May 2021 when he saw his primary doctor a few weeks ago.  Arash has additional history of hypertension, hyperlipidemia, CAD, hyponatremia, hypokalemia, insomnia (for which he is on Klonopin).  At his previous visit he was also complaining of some weakness.  He has history of heavy alcohol use which he had stopped.  With some of the vertigo episodes he was falling.  Occasional headache.  He does follow with cardiology outside of our system.    Arash is here with his son.   He says he falls unpredictably. He says he passes out at times with this. He feels weak thereafter.  No known abnormal movements.  He does not have any associated headache or pain.    Son has never seen one of the spells.  Certainly says that once in a while he loses consciousness but most of the time it is just a sensation of dizziness.  This can happen several times per day.  No definite triggers or anything that necessarily makes him feel better.  He does drink quite a bit of alcohol per son.  I asked Arash how much and he says \"I don't even know.\"  He has not noticed any changes in his hearing with the spells.  He " "has not noticed if turning over in bed triggers the dizziness.  He says he cannot really describe the feeling, not necessarily room spinning.  He says that his right foot is numb.    No family history of seizures.     Past Medical History:   Diagnosis Date     Depression      ETOH abuse      Hyperlipidemia LDL goal <70      Past Surgical History:   Procedure Laterality Date     ANGIOPLASTY  2015    MID LAD stent       amLODIPine (NORVASC) 10 MG tablet, Take 1 tablet (10 mg) by mouth daily  ASPIRIN 81 PO, Take by mouth daily  atorvastatin (LIPITOR) 40 MG tablet, Take 1 tablet (40 mg) by mouth daily  metoprolol succinate ER (TOPROL-XL) 25 MG 24 hr tablet, 2 in AM and 1 in PM  clonazePAM (KLONOPIN) 1 MG tablet, Take 1 tablet (1 mg) by mouth nightly as needed for anxiety or sleep (Patient not taking: Reported on 2022)  potassium chloride ER (KLOR-CON M) 20 MEQ CR tablet, Take 1 tablet (20 mEq) by mouth daily (Patient not taking: Reported on 2022)    No current facility-administered medications on file prior to visit.    Allergies   Allergen Reactions     Ace Inhibitors      rash     Melatonin      Shortness of breath        Problem (# of Occurrences) Relation (Name,Age of Onset)    Hypertension (1) Mother        Social History     Tobacco Use     Smoking status: Former Smoker     Years: 28.00     Types: Cigarettes     Quit date: 2021     Years since quittin.3     Smokeless tobacco: Never Used     Tobacco comment: smokes 3-4 cig day,   Substance Use Topics     Alcohol use: Yes     Comment: sometimes      Drug use: None       REVIEW OF SYSTEMS:                                                      10-point review of systems is negative except as mentioned above in HPI.     EXAM:                                                      Physical Exam:   Vitals: /74 (BP Location: Right arm, Patient Position: Sitting)   Pulse (!) 123   Ht 1.88 m (6' 2\")   Wt 88.2 kg (194 lb 6.4 oz)   BMI 24.96 kg/m  "   BMI= Body mass index is 24.96 kg/m .  GENERAL: NAD.  HEENT: NC/AT.   CV: RRR. S1, S2.   NECK: No bruits.  PULM: Non-labored breathing.   Neurologic:  MENTAL STATUS: Alert, attentive. Speech is fluent. Normal comprehension.  Normal concentration. Adequate fund of knowledge.   CRANIAL NERVES: Discs technically difficult to visualize. Visual fields intact to confrontation. Pupils equally, round and reactive to light. Facial sensation and movement normal. EOM full, though I noticed that it is hard for him to do smooth pursuit and he says something about his peripheral vision. Hearing intact to conversation. Trapezius strength intact. Tongue midline.  MOTOR: 5/5 in proximal and distal muscle groups of upper and lower extremities. Tone and bulk normal.   DTRs: Intact and symmetric in biceps, BR, patellae (1+ throughout).  Babinski down-going bilaterally.   SENSATION: Normal light touch and pinprick in the hands.  Decreased light touch and pinprick in the right lower extremity compared to the left. Intact proprioception at the left great toe only. Vibration: Diminished at both ankles.   COORDINATION: Normal finger nose finger. Finger tapping normal. Knee heel shin normal.  STATION AND GAIT: Romberg negative. Good postural reflexes. Casual gait appears normal.  SPECIAL TESTS: Patient is extremely symptomatic sitting up with testing.  Cannot keep his eyes closed he is dizzy and nauseous for several minutes thereafter.  Left hand-dominant.    ASSESSMENT and PLAN:                                                      Assessment:     ICD-10-CM    1. Dizziness  R42 MR Brain w/o & w Contrast     EEG Routine     meclizine (ANTIVERT) 25 MG tablet     Physical Therapy Referral     Mr. Corona is a pleasant 61-year-old man previously from [place], here for episodic vertigo/dizziness.  He is seems a little bit hesitant about doing a work-up.  Son will make sure that the tests are completed.  I suggest we do brain imaging and an EEG  and that we get him into physical therapy for vestibular evaluation.  In the meantime, I suggested we try meclizine for symptom management.  I would like to see Arash back in clinic after he works with PT and the tests are completed.    Plan:  -- Brain MRI to look at things from a structural standpoint.  -- EEG/brainwave study to make sure there is not any underlying seizure activity to explain your symptoms.  -- Referral to physical therapy for vestibular (inner ear) evaluation.  -- Meclizine as needed for dizziness/nausea.  -- I do recommend you cut down on your alcohol use as this can contribute to dizziness/imbalance.  -- Return to Neurology clinic after the above evaluations are completed.    Total Time: 45 minutes were spent with the patient and in chart review/documentation (as described above in Assessment and Plan) /coordinating the care on date of service.    Mary Armstrong MD  Neurology    CC: Reese Kern MD    Dragon software used in the dictation of this note.        Again, thank you for allowing me to participate in the care of your patient.        Sincerely,        Mary Armstrong MD

## 2022-04-04 NOTE — NURSING NOTE
Chief Complaint   Patient presents with     Vertigo     New patient -dizzy spells since last spring, it comes and goes. Patient fell yesterday due to dizzy spells.      Patient is here with son Blanche  Curtis Velasquez CMA@ on 4/4/2022 at 2:19 PM

## 2022-04-22 ENCOUNTER — ANCILLARY PROCEDURE (OUTPATIENT)
Dept: NEUROLOGY | Facility: CLINIC | Age: 62
End: 2022-04-22
Attending: PSYCHIATRY & NEUROLOGY
Payer: COMMERCIAL

## 2022-04-22 DIAGNOSIS — R42 DIZZINESS: ICD-10-CM

## 2022-04-22 PROCEDURE — 95816 EEG AWAKE AND DROWSY: CPT | Performed by: PSYCHIATRY & NEUROLOGY

## 2022-04-27 ENCOUNTER — TELEPHONE (OUTPATIENT)
Dept: NEUROLOGY | Facility: CLINIC | Age: 62
End: 2022-04-27
Payer: COMMERCIAL

## 2022-04-27 NOTE — TELEPHONE ENCOUNTER
Patient was given results as per Dr. Armstrong. Patient verbalized understanding and no further questions.     Curtis Velasquez CMA@ on 4/27/2022 at 12:47 PM

## 2022-04-27 NOTE — RESULT ENCOUNTER NOTE
Please let Arash know that his EEG came back normal.  No evidence of seizure activity.    Thank you,  Dr. Armstrong

## 2022-04-27 NOTE — TELEPHONE ENCOUNTER
----- Message from Mary Armstrong MD sent at 4/27/2022 12:44 PM CDT -----  Please let Arash know that his EEG came back normal.  No evidence of seizure activity.    Thank you,  Dr. Armstrong

## 2022-05-09 ENCOUNTER — HOSPITAL ENCOUNTER (OUTPATIENT)
Dept: PHYSICAL THERAPY | Facility: CLINIC | Age: 62
Discharge: HOME | End: 2022-05-09
Attending: PSYCHIATRY & NEUROLOGY
Payer: COMMERCIAL

## 2022-05-09 DIAGNOSIS — R26.89 BALANCE PROBLEMS: ICD-10-CM

## 2022-05-09 DIAGNOSIS — R42 DIZZINESS: Primary | ICD-10-CM

## 2022-05-09 PROCEDURE — 97163 PT EVAL HIGH COMPLEX 45 MIN: CPT | Mod: GP | Performed by: PHYSICAL THERAPIST

## 2022-05-09 NOTE — PROGRESS NOTES
[facility]                                                                                   OUTPATIENT PHYSICAL THERAPY FUNCTIONAL EVALUATION  PLAN OF TREATMENT FOR OUTPATIENT REHABILITATION  (COMPLETE FOR INITIAL CLAIMS ONLY)  Patient's Last Name, First Name, M.I.  YOB: 1960  Arash Corona     Provider's Name   [facility]   Medical Record No.  [id number]     Start of Care Date:  05/09/22   Onset Date:  05/01/21   Type:     _X__PT   ____OT  ____SLP Medical Diagnosis:   Dizziness     PT Diagnosis:  Balance Problems, Dizziness Visits from SOC:  1                             __________________________________________________________________________________  Plan of Treatment/Functional Goals:  balance training, ROM, strengthening, stretching, motor coordination training, neuromuscular re-education, gait training, other (see comments) (vestibular rehab)           GOALS  HEP  The patient will be independent in the performance of a HEP to facilitate continued gains in strength, endurance and balance outside of physical therapy.  08/06/22    DHI  The patient will demonstrate an 18 point improvement (54 or less) in DHI scoring to demonstrate a statistically significant improvement in dizziness symptom severity in order to increase tolerance for functional mobility tasks.  08/06/22    DGI  Patient will report good tolerance for varied community ambulation as demonstrated by scoring at least 19 out of 24 on the DGI showing ability to walk at varying speeds and maintain good stability with dynamic balance challenges.  08/06/22    10 meter walk  Patient will be able to walk a selected gait speed of at least 1.0 m/s demonstrating ability to walk at a pace that indications fair to good tolerance to community ambulation as well as decreased risk for  falls.  08/06/22    Therapy Frequency:  other (see comments)   Predicted Duration of Therapy Intervention:  potentially up to 2x/weeks with a decrease in frequency when appropriate up to 90 days    Eleonora Banuelos, PT                                    I CERTIFY THE NEED FOR THESE SERVICES FURNISHED UNDER        THIS PLAN OF TREATMENT AND WHILE UNDER MY CARE     (Physician co-signature of this document indicates review and certification of the therapy plan).                Certification Date From:    5/9/2022  Certification Date To:   8/6/2022    Referring Provider:  Mary Basurto MD    Initial Assessment  See Epic Evaluation- Start of Care Date: 05/09/22

## 2022-05-09 NOTE — PROGRESS NOTES
05/09/22 1300   Quick Adds   Quick Adds Certification;Vestibular Eval   Type of Visit Initial OP PT Evaluation   General Information   Start of Care Date 05/09/22   Referring Physician Mary Basurto MD   Orders Evaluate and Treat as Indicated   Order Date 04/04/22   Medical Diagnosis Dizziness   Onset of illness/injury or Date of Surgery 05/01/21   Surgical/Medical history reviewed Yes   Pertinent history of current problem (include personal factors and/or comorbidities that impact the POC) Patient is a 61 y.o. male referred to PT to eval and treat; per order  patient with episodic dizziness, positive natalie-hallpike.  Per chart review patient with significant PMH for HTN, hyperlipidemia, CAD, hyponatremia, hypokalemia, insomnia (on klonopin), and hx of alcohol use (states that it has been a months since having drink, son indicating that isn t true).  Reporting dizziness since May 2021 and that falls have occurred when dizzy.  Patient also reporting occasional headaches.  Patient reports that within the last year he has been experiencing dizziness as well as headache and nausea and that he will fall down.  Patient reports that he experiences falls when he wakes up and gets up and  it just happens.   Patient reporting fear of falling as well as walking outside.  Yesterday was most recent fall, went to turn kettle on and he just fell down.  Patient states he was  unconscious  for 5 or 6 minutes.  Reports no falls last week and prior it was 1 to 2 times per week.  Reports sitting in dizziness while seated in chair as well a nauseated.  Patient denies changes to vision and hearing or recent illnesses.  The only thing that seems to help is to sit and rest.  Patient pointing to back of head regarding headache when present and that he notices it more at night.   Pertinent Visual History  reading glasses, not sure when the last time he had his eyes checked, prefers to have book centered or right vs left    Prior level of function comment Patient reporting inactivity for the past few years.  Son reporting inactivity for the past few years as well in addition to not getting out of bed at times.   Current Community Support Family/friend caregiver  (adult children in area that try to help)   Living environment Apartment/condo   Home/Community Accessibility Comments Lives alone in a second floor apartment and utilizes the elevator vs stairs.   Patient/Family Goals Statement unknown as this appointment was scheduled for him and he doesn't know the purpose of this appointment   General Information Comments son indicating that his father is likely still drinking and smoking, son reporting that his father rarely allows anyone into his apartment   Fall Risk Screen   Fall screen completed by PT   Have you fallen 2 or more times in the past year? Yes   Have you fallen and had an injury in the past year? Yes  (fractured nose, chipped teeth, laceration to head)   Is patient a fall risk? Yes   Functional Scales   Functional Scales and Outcomes DHI: 72/100   Pain   Pain comments See above subjective comments   Cognitive Status Examination   Orientation person;time   Level of Consciousness alert   Follows Commands and Answers Questions other (see comments)   Cognitive Comment Son, at times, indicating patient not providing accurate information.  Patient and son reporting issues with memory, with son also reporting concerns about overall cognition and mood swings.   Posture   Posture Normal   Range of Motion (ROM)   ROM Comment Within functional limits for tasks performed   Strength   Strength Comments Within functional limits for tasks performed   Bed Mobility   Bed Mobility Comments Reports independence   Transfer Skills   Transfer Comments Modified independence   Gait   Gait Comments Patient able to independently ambulate from waiting area to/from clinic room demonstrating some instability however no significant loss of balance.    Balance   Balance Comments Slight instability observed with functional mobility tasks   Sensory Examination   Sensory Perception Comments Per neurologist note:  Decreased light touch and pinprick in the right lower extremity compared to the left. Intact proprioception at the left great toe only. Vibration: Diminished at both ankles.    Cervicogenic Screen   Neck ROM difficulty with cervical rotation to left with onset of dizziness and patient grabbing back of neck, otherwise WFL   Oculomotor Exam   Smooth Pursuit Abnormal   Smooth Pursuit Comment saccadic intrusions horizontal and vertical (left > right)   Saccades Abnormal   Saccades Comments over and under shooting (left > right and up > down)   VOR Other   VOR Comments unable to keep eyes on target to perform   Infrared Goggle Exam or Frenzel Lense Exam   Vestibular Suppressant in Last 24 Hours? Yes   Exam completed with Infrared Goggles   Spontaneous Nystagmus Downbeating   Gaze Evoked Nystagmus Other   Gaze Evoked Nystagmus comments right beating horizontal nystagmus with right gaze, with left gaze left eye stayed put for the most part and down beating nystagmus observed, increased down beating nystagmus observed with down gaze   Planned Therapy Interventions   Planned Therapy Interventions balance training;ROM;strengthening;stretching;motor coordination training;neuromuscular re-education;gait training;other (see comments)  (vestibular rehab)   Clinical Impression   Criteria for Skilled Therapeutic Interventions Met yes, treatment indicated   PT Diagnosis Balance Problems, Dizziness   Influenced by the following impairments Above ocular motor screening,   Functional limitations due to impairments Above oculomotor screen, spontaneous nystagmus as well as gaze evoked nystagmus, static and dynamic balance deficits, general deconditioning   Clinical Presentation Unstable/Unpredictable   Clinical Presentation Rationale Based on current presentation, PMH, and  social support.   Clinical Decision Making (Complexity) High complexity   Therapy Frequency other (see comments)   Predicted Duration of Therapy Intervention (days/wks) potentially up to 2x/weeks with a decrease in frequency when appropriate up to 90 days   Risk & Benefits of therapy have been explained Yes   Patient, Family & other staff in agreement with plan of care Yes   Clinical Impression Comments Patient is a 61 y.o. male referred to PT to eval and treat dizziness.  Upon exam, patient with significant central signs and symptoms (see above) vs peripheral findings, therefore, held additional vestibular testing at this time (head thrust, head shake, and positional testing) with recommendation that patient return to see neurologist for further assessment.  Pending further assessment from neurologist patient may benefit from skilled physical therapy to address complaints of dizziness and concerns regarding balance and mobility in order to increase safety and independence with ADLs, IADLs and functional mobility and decrease risk for falls.  Patient does have a significant history of alcohol abuse and may have some cognitive limitations which may impact his participation in physical therapy.   Education Assessment   Preferred Learning Style Pictures/video;Demonstration;Listening   Barriers to Learning Cognitive   GOALS   PT Eval Goals 1;2;3;4   Goal 1   Goal Identifier HEP   Goal Description The patient will be independent in the performance of a HEP to facilitate continued gains in strength, endurance and balance outside of physical therapy.   Target Date 08/06/22   Goal 2   Goal Identifier DHI   Goal Description The patient will demonstrate an 18 point improvement (54 or less) in DHI scoring to demonstrate a statistically significant improvement in dizziness symptom severity in order to increase tolerance for functional mobility tasks.   Goal Progress   (eval: 72/100)   Target Date 08/06/22   Goal 3   Goal  Identifier DGI   Goal Description Patient will report good tolerance for varied community ambulation as demonstrated by scoring at least 19 out of 24 on the DGI showing ability to walk at varying speeds and maintain good stability with dynamic balance challenges.   Target Date 08/06/22   Goal 4   Goal Identifier 10 meter walk   Goal Description Patient will be able to walk a selected gait speed of at least 1.0 m/s demonstrating ability to walk at a pace that indications fair to good tolerance to community ambulation as well as decreased risk for falls.   Target Date 08/06/22   Total Evaluation Time   PT Ginny, High Complexity Minutes (25948) 45

## 2022-05-10 ENCOUNTER — TELEPHONE (OUTPATIENT)
Dept: NEUROLOGY | Facility: CLINIC | Age: 62
End: 2022-05-10
Payer: COMMERCIAL

## 2022-05-10 DIAGNOSIS — H55.00 NYSTAGMUS: Primary | ICD-10-CM

## 2022-05-10 NOTE — TELEPHONE ENCOUNTER
Informed patient of Dr. Armstrong message. Patient verbalized understanding and stated that he already had an eye doctor appointment in [place] in June. Patient will have his son call to schedule him for the MRI as his son is his transportation.     Curtis Velasquez CMA@ on 5/10/2022 at 2:35 PM

## 2022-05-10 NOTE — TELEPHONE ENCOUNTER
Please let Arash know that because of some findings on his physical therapy evaluation, I recommend he have a thorough eye exam through Ophthalmology.  I am putting in the referral.  I also like for him to have the MRI that I ordered completed soon as possible.  I would like to see him back in clinic after these evaluations are completed.

## 2022-05-27 ENCOUNTER — HOSPITAL ENCOUNTER (OUTPATIENT)
Dept: MRI IMAGING | Facility: CLINIC | Age: 62
Discharge: HOME | End: 2022-05-27
Attending: PSYCHIATRY & NEUROLOGY | Admitting: PSYCHIATRY & NEUROLOGY
Payer: COMMERCIAL

## 2022-05-27 DIAGNOSIS — R42 DIZZINESS: ICD-10-CM

## 2022-05-27 PROCEDURE — A9585 GADOBUTROL INJECTION: HCPCS | Performed by: PSYCHIATRY & NEUROLOGY

## 2022-05-27 PROCEDURE — 70553 MRI BRAIN STEM W/O & W/DYE: CPT

## 2022-05-27 PROCEDURE — 255N000002 HC RX 255 OP 636: Performed by: PSYCHIATRY & NEUROLOGY

## 2022-05-27 RX ORDER — GADOBUTROL 604.72 MG/ML
9 INJECTION INTRAVENOUS ONCE
Status: COMPLETED | OUTPATIENT
Start: 2022-05-27 | End: 2022-05-27

## 2022-05-27 RX ADMIN — GADOBUTROL 9 ML: 604.72 INJECTION INTRAVENOUS at 14:54

## 2022-05-29 NOTE — RESULT ENCOUNTER NOTE
Please let Arash know that his brain MRI shows some age-related changes and changes in the vessels which can be due to vascular risk factors such as high blood pressure and cholesterol.  These are things that should be monitored by his primary doctor.  No structural changes in the brain to explain to the dizziness.    How are things going with physical therapy?    Thank you,  Dr. Armstrong

## 2022-05-31 ENCOUNTER — TELEPHONE (OUTPATIENT)
Dept: NEUROLOGY | Facility: CLINIC | Age: 62
End: 2022-05-31
Payer: COMMERCIAL

## 2022-05-31 NOTE — TELEPHONE ENCOUNTER
Patient was given results and recommendation as per Dr. Armstrong. Patient is agreeable with recommendation and no further questions at this time.    Patient stated that he's been going to the swimming pool and doing some walking. Patient has some issue with his knee, dizziness, and balance, so he's afraid to do long distance walk.     Curtis Velasquez CMA@ on 5/31/2022 at 10:04 AM

## 2022-05-31 NOTE — TELEPHONE ENCOUNTER
----- Message from Mary Armstrong MD sent at 5/29/2022  3:09 PM CDT -----  Please let Arash know that his brain MRI shows some age-related changes and changes in the vessels which can be due to vascular risk factors such as high blood pressure and cholesterol.  These are things that should be monitored by his primary doctor.  No structural changes in the brain to explain to the dizziness.    How are things going with physical therapy?    Thank you,  Dr. Armstrong

## 2022-07-02 ENCOUNTER — HOSPITAL ENCOUNTER (INPATIENT)
Facility: CLINIC | Age: 62
LOS: 7 days | Discharge: HOME HEALTH | End: 2022-07-10
Attending: EMERGENCY MEDICINE | Admitting: INTERNAL MEDICINE
Payer: COMMERCIAL

## 2022-07-02 DIAGNOSIS — N17.9 ACUTE RENAL FAILURE, UNSPECIFIED ACUTE RENAL FAILURE TYPE (H): ICD-10-CM

## 2022-07-02 DIAGNOSIS — M62.82 NON-TRAUMATIC RHABDOMYOLYSIS: ICD-10-CM

## 2022-07-02 DIAGNOSIS — F10.20 ALCOHOLISM (H): ICD-10-CM

## 2022-07-02 DIAGNOSIS — R62.7 ADULT FAILURE TO THRIVE: ICD-10-CM

## 2022-07-02 DIAGNOSIS — F17.200 TOBACCO USE DISORDER: Primary | ICD-10-CM

## 2022-07-02 PROCEDURE — 93005 ELECTROCARDIOGRAM TRACING: CPT

## 2022-07-02 PROCEDURE — 99285 EMERGENCY DEPT VISIT HI MDM: CPT | Mod: 25

## 2022-07-02 PROCEDURE — 51702 INSERT TEMP BLADDER CATH: CPT

## 2022-07-02 PROCEDURE — C9803 HOPD COVID-19 SPEC COLLECT: HCPCS

## 2022-07-02 PROCEDURE — 51798 US URINE CAPACITY MEASURE: CPT

## 2022-07-03 ENCOUNTER — APPOINTMENT (OUTPATIENT)
Dept: GENERAL RADIOLOGY | Facility: CLINIC | Age: 62
End: 2022-07-03
Attending: EMERGENCY MEDICINE
Payer: COMMERCIAL

## 2022-07-03 ENCOUNTER — APPOINTMENT (OUTPATIENT)
Dept: CT IMAGING | Facility: CLINIC | Age: 62
End: 2022-07-03
Attending: EMERGENCY MEDICINE
Payer: COMMERCIAL

## 2022-07-03 ENCOUNTER — APPOINTMENT (OUTPATIENT)
Dept: MRI IMAGING | Facility: CLINIC | Age: 62
End: 2022-07-03
Attending: INTERNAL MEDICINE
Payer: COMMERCIAL

## 2022-07-03 ENCOUNTER — APPOINTMENT (OUTPATIENT)
Dept: CT IMAGING | Facility: CLINIC | Age: 62
End: 2022-07-03
Attending: INTERNAL MEDICINE
Payer: COMMERCIAL

## 2022-07-03 PROBLEM — R62.7 ADULT FAILURE TO THRIVE: Status: ACTIVE | Noted: 2022-07-03

## 2022-07-03 PROBLEM — F10.20 ALCOHOLISM (H): Status: ACTIVE | Noted: 2022-07-03

## 2022-07-03 PROBLEM — M62.82 NON-TRAUMATIC RHABDOMYOLYSIS: Status: ACTIVE | Noted: 2022-07-03

## 2022-07-03 PROBLEM — N17.9 ACUTE RENAL FAILURE, UNSPECIFIED ACUTE RENAL FAILURE TYPE (H): Status: ACTIVE | Noted: 2022-07-03

## 2022-07-03 LAB
ALBUMIN SERPL-MCNC: 2.3 G/DL (ref 3.4–5)
ALBUMIN UR-MCNC: 20 MG/DL
ALP SERPL-CCNC: 89 U/L (ref 40–150)
ALT SERPL W P-5'-P-CCNC: 69 U/L (ref 0–70)
AMMONIA PLAS-SCNC: NORMAL UMOL/L
AMORPH CRY #/AREA URNS HPF: ABNORMAL /HPF
ANION GAP SERPL CALCULATED.3IONS-SCNC: 11 MMOL/L (ref 3–14)
ANION GAP SERPL CALCULATED.3IONS-SCNC: 15 MMOL/L (ref 3–14)
ANION GAP SERPL CALCULATED.3IONS-SCNC: 17 MMOL/L (ref 3–14)
ANION GAP SERPL CALCULATED.3IONS-SCNC: 19 MMOL/L (ref 3–14)
APPEARANCE UR: ABNORMAL
AST SERPL W P-5'-P-CCNC: 111 U/L (ref 0–45)
BASE EXCESS BLDV CALC-SCNC: 15.1 MMOL/L (ref -7.7–1.9)
BASOPHILS # BLD AUTO: 0 10E3/UL (ref 0–0.2)
BASOPHILS NFR BLD AUTO: 0 %
BILIRUB SERPL-MCNC: 1.2 MG/DL (ref 0.2–1.3)
BILIRUB UR QL STRIP: NEGATIVE
BUN SERPL-MCNC: 148 MG/DL (ref 7–30)
BUN SERPL-MCNC: 159 MG/DL (ref 7–30)
BUN SERPL-MCNC: 166 MG/DL (ref 7–30)
BUN SERPL-MCNC: 174 MG/DL (ref 7–30)
BUN SERPL-MCNC: ABNORMAL MG/DL
CALCIUM SERPL-MCNC: 7.6 MG/DL (ref 8.5–10.1)
CALCIUM SERPL-MCNC: 7.8 MG/DL (ref 8.5–10.1)
CALCIUM SERPL-MCNC: 7.9 MG/DL (ref 8.5–10.1)
CALCIUM SERPL-MCNC: 8.3 MG/DL (ref 8.5–10.1)
CAOX CRY #/AREA URNS HPF: ABNORMAL /HPF
CHLORIDE BLD-SCNC: 75 MMOL/L (ref 94–109)
CHLORIDE BLD-SCNC: 82 MMOL/L (ref 94–109)
CHLORIDE BLD-SCNC: 87 MMOL/L (ref 94–109)
CHLORIDE BLD-SCNC: 92 MMOL/L (ref 94–109)
CK SERPL-CCNC: 2350 U/L (ref 30–300)
CO2 SERPL-SCNC: 30 MMOL/L (ref 20–32)
CO2 SERPL-SCNC: 38 MMOL/L (ref 20–32)
CO2 SERPL-SCNC: 38 MMOL/L (ref 20–32)
CO2 SERPL-SCNC: 39 MMOL/L (ref 20–32)
COLOR UR AUTO: YELLOW
CREAT SERPL-MCNC: 3.98 MG/DL (ref 0.66–1.25)
CREAT SERPL-MCNC: 4.34 MG/DL (ref 0.66–1.25)
CREAT SERPL-MCNC: 4.69 MG/DL (ref 0.66–1.25)
CREAT SERPL-MCNC: 5.09 MG/DL (ref 0.66–1.25)
CREAT UR-MCNC: 102 MG/DL
EOSINOPHIL # BLD AUTO: 0 10E3/UL (ref 0–0.7)
EOSINOPHIL NFR BLD AUTO: 1 %
ERYTHROCYTE [DISTWIDTH] IN BLOOD BY AUTOMATED COUNT: 13.3 % (ref 10–15)
ETHANOL SERPL-MCNC: <0.01 G/DL
FRACT EXCRET NA UR+SERPL-RTO: 0.9 %
GFR SERPL CREATININE-BSD FRML MDRD: 12 ML/MIN/1.73M2
GFR SERPL CREATININE-BSD FRML MDRD: 13 ML/MIN/1.73M2
GFR SERPL CREATININE-BSD FRML MDRD: 15 ML/MIN/1.73M2
GFR SERPL CREATININE-BSD FRML MDRD: 16 ML/MIN/1.73M2
GLUCOSE BLD-MCNC: 109 MG/DL (ref 70–99)
GLUCOSE BLD-MCNC: 140 MG/DL (ref 70–99)
GLUCOSE BLD-MCNC: 176 MG/DL (ref 70–99)
GLUCOSE BLD-MCNC: 95 MG/DL (ref 70–99)
GLUCOSE UR STRIP-MCNC: NEGATIVE MG/DL
HCO3 BLDV-SCNC: 41 MMOL/L (ref 21–28)
HCT VFR BLD AUTO: 43.3 % (ref 40–53)
HGB BLD-MCNC: 14.9 G/DL (ref 13.3–17.7)
HGB UR QL STRIP: ABNORMAL
HOLD SPECIMEN: NORMAL
HYALINE CASTS: 12 /LPF
IMM GRANULOCYTES # BLD: 0.1 10E3/UL
IMM GRANULOCYTES NFR BLD: 1 %
INR PPP: 1.11 (ref 0.85–1.15)
KETONES UR STRIP-MCNC: NEGATIVE MG/DL
LACTATE SERPL-SCNC: 2.1 MMOL/L (ref 0.7–2)
LACTATE SERPL-SCNC: 2.3 MMOL/L (ref 0.7–2)
LACTATE SERPL-SCNC: 2.8 MMOL/L (ref 0.7–2)
LACTATE SERPL-SCNC: 2.8 MMOL/L (ref 0.7–2)
LEUKOCYTE ESTERASE UR QL STRIP: NEGATIVE
LIPASE SERPL-CCNC: 2322 U/L (ref 73–393)
LYMPHOCYTES # BLD AUTO: 0.5 10E3/UL (ref 0.8–5.3)
LYMPHOCYTES NFR BLD AUTO: 9 %
MAGNESIUM SERPL-MCNC: 2.4 MG/DL (ref 1.6–2.3)
MAGNESIUM SERPL-MCNC: 3 MG/DL (ref 1.6–2.3)
MCH RBC QN AUTO: 33.3 PG (ref 26.5–33)
MCHC RBC AUTO-ENTMCNC: 34.4 G/DL (ref 31.5–36.5)
MCV RBC AUTO: 97 FL (ref 78–100)
MONOCYTES # BLD AUTO: 0.6 10E3/UL (ref 0–1.3)
MONOCYTES NFR BLD AUTO: 11 %
MUCOUS THREADS #/AREA URNS LPF: PRESENT /LPF
NEUTROPHILS # BLD AUTO: 4.6 10E3/UL (ref 1.6–8.3)
NEUTROPHILS NFR BLD AUTO: 78 %
NITRATE UR QL: NEGATIVE
NRBC # BLD AUTO: 0.1 10E3/UL
NRBC BLD AUTO-RTO: 1 /100
O2/TOTAL GAS SETTING VFR VENT: 0 %
OXYHGB MFR BLDV: 73 % (ref 70–75)
PCO2 BLDV: 56 MM HG (ref 40–50)
PH BLDV: 7.48 [PH] (ref 7.32–7.43)
PH UR STRIP: 5 [PH] (ref 5–7)
PHOSPHATE SERPL-MCNC: 8.3 MG/DL (ref 2.5–4.5)
PLATELET # BLD AUTO: 154 10E3/UL (ref 150–450)
PO2 BLDV: 44 MM HG (ref 25–47)
POTASSIUM BLD-SCNC: 2.8 MMOL/L (ref 3.4–5.3)
POTASSIUM BLD-SCNC: 2.9 MMOL/L (ref 3.4–5.3)
POTASSIUM BLD-SCNC: 3 MMOL/L (ref 3.4–5.3)
POTASSIUM BLD-SCNC: 3.5 MMOL/L (ref 3.4–5.3)
PROT SERPL-MCNC: 7.6 G/DL (ref 6.8–8.8)
RBC # BLD AUTO: 4.48 10E6/UL (ref 4.4–5.9)
RBC URINE: 4 /HPF
SARS-COV-2 RNA RESP QL NAA+PROBE: NEGATIVE
SODIUM SERPL-SCNC: 132 MMOL/L (ref 133–144)
SODIUM SERPL-SCNC: 136 MMOL/L (ref 133–144)
SODIUM SERPL-SCNC: 136 MMOL/L (ref 133–144)
SODIUM SERPL-SCNC: 139 MMOL/L (ref 133–144)
SODIUM UR-SCNC: 29 MMOL/L
SP GR UR STRIP: 1.01 (ref 1–1.03)
TROPONIN I SERPL HS-MCNC: 31 NG/L
UROBILINOGEN UR STRIP-MCNC: NORMAL MG/DL
WBC # BLD AUTO: 5.9 10E3/UL (ref 4–11)
WBC URINE: 6 /HPF

## 2022-07-03 PROCEDURE — 85025 COMPLETE CBC W/AUTO DIFF WBC: CPT | Performed by: EMERGENCY MEDICINE

## 2022-07-03 PROCEDURE — 83605 ASSAY OF LACTIC ACID: CPT | Performed by: INTERNAL MEDICINE

## 2022-07-03 PROCEDURE — 74176 CT ABD & PELVIS W/O CONTRAST: CPT

## 2022-07-03 PROCEDURE — 99223 1ST HOSP IP/OBS HIGH 75: CPT | Mod: AI | Performed by: INTERNAL MEDICINE

## 2022-07-03 PROCEDURE — 84100 ASSAY OF PHOSPHORUS: CPT | Performed by: INTERNAL MEDICINE

## 2022-07-03 PROCEDURE — 120N000001 HC R&B MED SURG/OB

## 2022-07-03 PROCEDURE — 258N000003 HC RX IP 258 OP 636: Performed by: INTERNAL MEDICINE

## 2022-07-03 PROCEDURE — 258N000003 HC RX IP 258 OP 636: Performed by: EMERGENCY MEDICINE

## 2022-07-03 PROCEDURE — 83735 ASSAY OF MAGNESIUM: CPT | Performed by: INTERNAL MEDICINE

## 2022-07-03 PROCEDURE — 82310 ASSAY OF CALCIUM: CPT | Performed by: INTERNAL MEDICINE

## 2022-07-03 PROCEDURE — 83735 ASSAY OF MAGNESIUM: CPT | Performed by: EMERGENCY MEDICINE

## 2022-07-03 PROCEDURE — 82077 ASSAY SPEC XCP UR&BREATH IA: CPT | Performed by: EMERGENCY MEDICINE

## 2022-07-03 PROCEDURE — 85610 PROTHROMBIN TIME: CPT | Performed by: EMERGENCY MEDICINE

## 2022-07-03 PROCEDURE — 70450 CT HEAD/BRAIN W/O DYE: CPT

## 2022-07-03 PROCEDURE — 250N000013 HC RX MED GY IP 250 OP 250 PS 637: Performed by: INTERNAL MEDICINE

## 2022-07-03 PROCEDURE — 82040 ASSAY OF SERUM ALBUMIN: CPT | Performed by: EMERGENCY MEDICINE

## 2022-07-03 PROCEDURE — 71046 X-RAY EXAM CHEST 2 VIEWS: CPT

## 2022-07-03 PROCEDURE — 70486 CT MAXILLOFACIAL W/O DYE: CPT

## 2022-07-03 PROCEDURE — 250N000009 HC RX 250: Performed by: EMERGENCY MEDICINE

## 2022-07-03 PROCEDURE — 72125 CT NECK SPINE W/O DYE: CPT

## 2022-07-03 PROCEDURE — 83690 ASSAY OF LIPASE: CPT | Performed by: INTERNAL MEDICINE

## 2022-07-03 PROCEDURE — 80053 COMPREHEN METABOLIC PANEL: CPT | Performed by: EMERGENCY MEDICINE

## 2022-07-03 PROCEDURE — 84300 ASSAY OF URINE SODIUM: CPT | Performed by: INTERNAL MEDICINE

## 2022-07-03 PROCEDURE — 82805 BLOOD GASES W/O2 SATURATION: CPT | Performed by: EMERGENCY MEDICINE

## 2022-07-03 PROCEDURE — 83605 ASSAY OF LACTIC ACID: CPT | Performed by: EMERGENCY MEDICINE

## 2022-07-03 PROCEDURE — 250N000011 HC RX IP 250 OP 636: Performed by: EMERGENCY MEDICINE

## 2022-07-03 PROCEDURE — 36415 COLL VENOUS BLD VENIPUNCTURE: CPT | Performed by: INTERNAL MEDICINE

## 2022-07-03 PROCEDURE — 250N000011 HC RX IP 250 OP 636: Performed by: INTERNAL MEDICINE

## 2022-07-03 PROCEDURE — HZ2ZZZZ DETOXIFICATION SERVICES FOR SUBSTANCE ABUSE TREATMENT: ICD-10-PCS | Performed by: INTERNAL MEDICINE

## 2022-07-03 PROCEDURE — 70551 MRI BRAIN STEM W/O DYE: CPT

## 2022-07-03 PROCEDURE — 82550 ASSAY OF CK (CPK): CPT | Performed by: EMERGENCY MEDICINE

## 2022-07-03 PROCEDURE — 99233 SBSQ HOSP IP/OBS HIGH 50: CPT | Performed by: INTERNAL MEDICINE

## 2022-07-03 PROCEDURE — U0005 INFEC AGEN DETEC AMPLI PROBE: HCPCS | Performed by: EMERGENCY MEDICINE

## 2022-07-03 PROCEDURE — 96365 THER/PROPH/DIAG IV INF INIT: CPT

## 2022-07-03 PROCEDURE — 99207 PR NO BILLABLE SERVICE THIS VISIT: CPT | Performed by: INTERNAL MEDICINE

## 2022-07-03 PROCEDURE — 96361 HYDRATE IV INFUSION ADD-ON: CPT

## 2022-07-03 PROCEDURE — 82140 ASSAY OF AMMONIA: CPT | Performed by: EMERGENCY MEDICINE

## 2022-07-03 PROCEDURE — 36415 COLL VENOUS BLD VENIPUNCTURE: CPT | Performed by: EMERGENCY MEDICINE

## 2022-07-03 PROCEDURE — 96375 TX/PRO/DX INJ NEW DRUG ADDON: CPT

## 2022-07-03 PROCEDURE — 84484 ASSAY OF TROPONIN QUANT: CPT | Performed by: EMERGENCY MEDICINE

## 2022-07-03 PROCEDURE — 81003 URINALYSIS AUTO W/O SCOPE: CPT | Performed by: EMERGENCY MEDICINE

## 2022-07-03 RX ORDER — OLANZAPINE 5 MG/1
5-10 TABLET, ORALLY DISINTEGRATING ORAL EVERY 6 HOURS PRN
Status: DISCONTINUED | OUTPATIENT
Start: 2022-07-03 | End: 2022-07-05

## 2022-07-03 RX ORDER — METOPROLOL SUCCINATE 25 MG/1
25 TABLET, EXTENDED RELEASE ORAL DAILY
Status: DISCONTINUED | OUTPATIENT
Start: 2022-07-03 | End: 2022-07-10 | Stop reason: HOSPADM

## 2022-07-03 RX ORDER — ONDANSETRON 4 MG/1
4 TABLET, ORALLY DISINTEGRATING ORAL EVERY 6 HOURS PRN
Status: DISCONTINUED | OUTPATIENT
Start: 2022-07-03 | End: 2022-07-10 | Stop reason: HOSPADM

## 2022-07-03 RX ORDER — ACETAMINOPHEN 650 MG/1
650 SUPPOSITORY RECTAL EVERY 6 HOURS PRN
Status: DISCONTINUED | OUTPATIENT
Start: 2022-07-03 | End: 2022-07-10 | Stop reason: HOSPADM

## 2022-07-03 RX ORDER — THIAMINE HYDROCHLORIDE 100 MG/ML
100 INJECTION, SOLUTION INTRAMUSCULAR; INTRAVENOUS DAILY
Status: DISCONTINUED | OUTPATIENT
Start: 2022-07-03 | End: 2022-07-03

## 2022-07-03 RX ORDER — ONDANSETRON 2 MG/ML
4 INJECTION INTRAMUSCULAR; INTRAVENOUS EVERY 6 HOURS PRN
Status: DISCONTINUED | OUTPATIENT
Start: 2022-07-03 | End: 2022-07-10 | Stop reason: HOSPADM

## 2022-07-03 RX ORDER — ASPIRIN 81 MG/1
81 TABLET, CHEWABLE ORAL DAILY
Status: DISCONTINUED | OUTPATIENT
Start: 2022-07-04 | End: 2022-07-10 | Stop reason: HOSPADM

## 2022-07-03 RX ORDER — ACETAMINOPHEN 325 MG/1
650 TABLET ORAL EVERY 6 HOURS PRN
Status: DISCONTINUED | OUTPATIENT
Start: 2022-07-03 | End: 2022-07-10 | Stop reason: HOSPADM

## 2022-07-03 RX ORDER — HALOPERIDOL 5 MG/ML
2.5-5 INJECTION INTRAMUSCULAR EVERY 6 HOURS PRN
Status: DISCONTINUED | OUTPATIENT
Start: 2022-07-03 | End: 2022-07-05

## 2022-07-03 RX ORDER — AMLODIPINE BESYLATE 10 MG/1
10 TABLET ORAL DAILY
Status: DISCONTINUED | OUTPATIENT
Start: 2022-07-03 | End: 2022-07-10 | Stop reason: HOSPADM

## 2022-07-03 RX ORDER — SODIUM CHLORIDE, SODIUM LACTATE, POTASSIUM CHLORIDE, CALCIUM CHLORIDE 600; 310; 30; 20 MG/100ML; MG/100ML; MG/100ML; MG/100ML
INJECTION, SOLUTION INTRAVENOUS CONTINUOUS
Status: ACTIVE | OUTPATIENT
Start: 2022-07-03 | End: 2022-07-04

## 2022-07-03 RX ORDER — FLUMAZENIL 0.1 MG/ML
0.2 INJECTION, SOLUTION INTRAVENOUS
Status: DISCONTINUED | OUTPATIENT
Start: 2022-07-03 | End: 2022-07-07

## 2022-07-03 RX ORDER — LORAZEPAM 2 MG/ML
1-2 INJECTION INTRAMUSCULAR EVERY 30 MIN PRN
Status: DISCONTINUED | OUTPATIENT
Start: 2022-07-03 | End: 2022-07-07

## 2022-07-03 RX ORDER — ATORVASTATIN CALCIUM 40 MG/1
40 TABLET, FILM COATED ORAL DAILY
Status: DISCONTINUED | OUTPATIENT
Start: 2022-07-03 | End: 2022-07-03

## 2022-07-03 RX ORDER — MULTIPLE VITAMINS W/ MINERALS TAB 9MG-400MCG
1 TAB ORAL DAILY
Status: DISCONTINUED | OUTPATIENT
Start: 2022-07-03 | End: 2022-07-10 | Stop reason: HOSPADM

## 2022-07-03 RX ORDER — MULTIPLE VITAMINS W/ MINERALS TAB 9MG-400MCG
1 TAB ORAL ONCE
Status: DISCONTINUED | OUTPATIENT
Start: 2022-07-03 | End: 2022-07-03

## 2022-07-03 RX ORDER — LIDOCAINE 40 MG/G
CREAM TOPICAL
Status: DISCONTINUED | OUTPATIENT
Start: 2022-07-03 | End: 2022-07-10 | Stop reason: HOSPADM

## 2022-07-03 RX ORDER — SODIUM CHLORIDE 9 MG/ML
INJECTION, SOLUTION INTRAVENOUS CONTINUOUS
Status: DISCONTINUED | OUTPATIENT
Start: 2022-07-03 | End: 2022-07-03

## 2022-07-03 RX ORDER — FOLIC ACID 1 MG/1
1 TABLET ORAL DAILY
Status: COMPLETED | OUTPATIENT
Start: 2022-07-03 | End: 2022-07-07

## 2022-07-03 RX ORDER — POTASSIUM CHLORIDE 1500 MG/1
40 TABLET, EXTENDED RELEASE ORAL ONCE
Status: COMPLETED | OUTPATIENT
Start: 2022-07-03 | End: 2022-07-03

## 2022-07-03 RX ORDER — LORAZEPAM 1 MG/1
1-2 TABLET ORAL EVERY 30 MIN PRN
Status: DISCONTINUED | OUTPATIENT
Start: 2022-07-03 | End: 2022-07-07

## 2022-07-03 RX ADMIN — SODIUM CHLORIDE: 9 INJECTION, SOLUTION INTRAVENOUS at 08:15

## 2022-07-03 RX ADMIN — AMLODIPINE BESYLATE 10 MG: 10 TABLET ORAL at 12:04

## 2022-07-03 RX ADMIN — ASPIRIN 325 MG: 325 TABLET, COATED ORAL at 12:04

## 2022-07-03 RX ADMIN — SODIUM CHLORIDE: 9 INJECTION, SOLUTION INTRAVENOUS at 05:32

## 2022-07-03 RX ADMIN — SODIUM CHLORIDE 1000 ML: 9 INJECTION, SOLUTION INTRAVENOUS at 00:31

## 2022-07-03 RX ADMIN — METOPROLOL SUCCINATE 25 MG: 25 TABLET, EXTENDED RELEASE ORAL at 13:38

## 2022-07-03 RX ADMIN — SODIUM CHLORIDE 1000 ML: 9 INJECTION, SOLUTION INTRAVENOUS at 04:20

## 2022-07-03 RX ADMIN — SODIUM CHLORIDE, POTASSIUM CHLORIDE, SODIUM LACTATE AND CALCIUM CHLORIDE: 600; 310; 30; 20 INJECTION, SOLUTION INTRAVENOUS at 20:04

## 2022-07-03 RX ADMIN — SODIUM CHLORIDE 1000 ML: 9 INJECTION, SOLUTION INTRAVENOUS at 06:55

## 2022-07-03 RX ADMIN — MULTIPLE VITAMINS W/ MINERALS TAB 1 TABLET: TAB at 12:04

## 2022-07-03 RX ADMIN — THIAMINE HYDROCHLORIDE 100 MG: 100 INJECTION, SOLUTION INTRAMUSCULAR; INTRAVENOUS at 08:00

## 2022-07-03 RX ADMIN — SODIUM CHLORIDE, POTASSIUM CHLORIDE, SODIUM LACTATE AND CALCIUM CHLORIDE: 600; 310; 30; 20 INJECTION, SOLUTION INTRAVENOUS at 13:11

## 2022-07-03 RX ADMIN — SODIUM CHLORIDE, POTASSIUM CHLORIDE, SODIUM LACTATE AND CALCIUM CHLORIDE: 600; 310; 30; 20 INJECTION, SOLUTION INTRAVENOUS at 11:30

## 2022-07-03 RX ADMIN — FOLIC ACID: 5 INJECTION, SOLUTION INTRAMUSCULAR; INTRAVENOUS; SUBCUTANEOUS at 01:24

## 2022-07-03 RX ADMIN — POTASSIUM CHLORIDE 40 MEQ: 1500 TABLET, EXTENDED RELEASE ORAL at 06:49

## 2022-07-03 RX ADMIN — FOLIC ACID 1 MG: 1 TABLET ORAL at 12:04

## 2022-07-03 ASSESSMENT — ACTIVITIES OF DAILY LIVING (ADL)
CHANGE_IN_FUNCTIONAL_STATUS_SINCE_ONSET_OF_CURRENT_ILLNESS/INJURY: NO
ADLS_ACUITY_SCORE: 38
DOING_ERRANDS_INDEPENDENTLY_DIFFICULTY: NO
DRESSING/BATHING_DIFFICULTY: NO
FALL_HISTORY_WITHIN_LAST_SIX_MONTHS: NO
WALKING_OR_CLIMBING_STAIRS_DIFFICULTY: NO
ADLS_ACUITY_SCORE: 35
TOILETING_ISSUES: NO
ADLS_ACUITY_SCORE: 49
ADLS_ACUITY_SCORE: 49
ADLS_ACUITY_SCORE: 38
ADLS_ACUITY_SCORE: 49
ADLS_ACUITY_SCORE: 53
ADLS_ACUITY_SCORE: 53
ADLS_ACUITY_SCORE: 38
DIFFICULTY_EATING/SWALLOWING: NO

## 2022-07-03 ASSESSMENT — ENCOUNTER SYMPTOMS: CONFUSION: 1

## 2022-07-03 NOTE — PLAN OF CARE
The Lactic acid level is elevated due to 2.8, at this time there is no sign of severe sepsis or septic shock.

## 2022-07-03 NOTE — PHARMACY-ADMISSION MEDICATION HISTORY
Admission medication history interview status for this patient is complete. See [facility] admission navigator for allergy information, prior to admission medications and immunization status.     Medication history interview source(s):Patient  Medication history resources (including written lists, pill bottles, clinic record):None  Primary pharmacy:[facility]    Changes made to PTA medication list:  Added: none  Deleted: Clonazepam and potassium  Changed: none    Actions taken by pharmacist (provider contacted, etc):None     Additional medication history information:  Compliance concerns.    Last rx fills were 2/24/22 for 90 days. Pt states does not fill scripts anywhere else.    Medication reconciliation/reorder completed by provider prior to medication history? No    For patients on insulin therapy: No    Prior to Admission medications    Medication Sig Last Dose Taking? Auth Provider Long Term End Date   amLODIPine (NORVASC) 10 MG tablet Take 1 tablet (10 mg) by mouth daily Unknown at Unknown time Yes Reese Kern MD Yes    ASPIRIN 81 PO Take by mouth daily Unknown at Unknown time Yes Reported, Patient     atorvastatin (LIPITOR) 40 MG tablet Take 1 tablet (40 mg) by mouth daily Unknown at Unknown time Yes Reese Kern MD Yes    meclizine (ANTIVERT) 25 MG tablet Take 1 tablet (25 mg) by mouth 3 times daily as needed for dizziness Unknown at Unknown time Yes Mary Basurto MD     metoprolol succinate ER (TOPROL-XL) 25 MG 24 hr tablet 2 in AM and 1 in PM Unknown at Unknown time Yes Reese Kern MD Yes

## 2022-07-03 NOTE — ED NOTES
Son Arash at bedside. Son reports patient has frequent falls over the past few months and thinks his father fell and was on the floor for the past 1-2 days (no one has been able to contact his for 2+ days due to a dead cell phone). Son also reports the swelling and drainage of right eye is new.

## 2022-07-03 NOTE — ED NOTES
Care Management Follow Up    Length of Stay (days): 0    Expected Discharge Date:       Concerns to be Addressed:       Patient plan of care discussed at interdisciplinary rounds: Yes    Anticipated Discharge Disposition:       Anticipated Discharge Services:    Anticipated Discharge DME:      Patient/family educated on Medicare website which has current facility and service quality ratings:    Education Provided on the Discharge Plan:    Patient/Family in Agreement with the Plan:      Referrals Placed by CM/SW:    Private pay costs discussed: Not applicable    Additional Information:  Attempted to meet with pt however pt was sleeping. Per notes pt is A/O to person, time. Forgetful about situation and place. Attempted to call pt's son Arash [phone]. No answer and phone kept ringing. No way to leave a VM.       DARRIAN Watson

## 2022-07-03 NOTE — H&P
Admitted: 07/02/2022    CHIEF COMPLAINT:  Fall, history of alcohol abuse, failure to thrive.    HISTORY OF PRESENT ILLNESS:  History was obtained from the ER records and the patient, though from the patient is an unreliable historian. I attempted to get in touch with his son, was unable.  This is a 61-year-old male with a history of hypertension, hyperlipidemia, coronary artery disease, status post PCI to his mid LAD in 2015, who lives at home with his cat.  He is apparently disabled.  He tells me he has no history of alcohol abuse; however, per his son who was present earlier and spoke to Dr. Lo, the ER physician, the patient does drink alcohol regularly, which is brought to his house by his sister.  The patient was found down by his family today.  It is felt that the patient had been down for roughly three days.  He was unable to get up.  His son found him on the floor and subsequently EMS was summoned.  The patient states that he did pass out; however, it is difficult to verify any further details.  The patient denies any chest pain or shortness of breath.  He does admit to taking NSAIDs, Advil.      In the ER over here, he was seen by Dr. Lo.  I discussed care with him.  I am asked to admit him for further evaluation.    PAST MEDICAL HISTORY:  Hypertension.  Hyperlipidemia.  Coronary artery disease.  History of alcohol abuse.    FAMILY HISTORY:  Significant for diabetes.    SOCIAL HISTORY:  The patient does not smoke.  He denies drinking alcohol; however, his son mentioned to the ER doctor that he drinks regularly.    ALLERGIES:  Include ACE INHIBITORS and MELATONIN.    HOME MEDICATIONS:  List includes:  1.  Norvasc.  2.  Lipitor.  3.  Hydrochlorothiazide.    REVIEW OF SYSTEMS:  He denies any abdominal pain.  He denies any fevers or chills.  He does endorse emesis.  He does use NSAIDs and has been taking Advil for quite some time.  All other systems otherwise extensively reviewed and are deemed  unremarkable and negative.    PHYSICAL EXAMINATION:    VITAL SIGNS:  His vital signs are as follows:  His temperature is 97.5, pulse 81, blood pressure is 135/102, respiratory rate 21, O2 sat is 96% on room air.  GENERAL:  He is disheveled, unkempt.  He has swelling in his right periorbital region and a pressure ulcer on his right parotid region.  His pupils are equal, round, reactive to light.  He is oriented to self, place.  He thinks it is June.  He is able to tell me his address.  He follows commands appropriately.  HEENT:  His mucous membranes appear dry.  LUNGS:  Clear to auscultation bilaterally.  HEART:  Regular rate.  S1, S2 normal.  No murmurs or gallops.  ABDOMEN:  Soft, nontender, nondistended with good bowel sounds.  EXTREMITIES:  He has abrasions on his elbows, as well as knees bilaterally.  He has some mottling of his lower extremities.  There is no rash noted.  NEUROLOGICAL:  Cranial nerves II through XII are grossly within normal limits.  Motor:  He moves all his extremities, though is globally weak.    LABORATORY DATA:  Obtained here shows a sodium of 132, potassium 3.0, chloride 75, bicarbonate 38, , creatinine 5.09, GFR of 12, calcium 8.3, anion gap of 19, albumin 2.3, total protein 7.6, alkaline phosphatase is 89, ALT of 69, AST of 111.  Ammonia level is pending.  Total bilirubin is 1.2.  His CK level was 2350.  Lactic acid 2.8.  Troponin 31, glucose 176.  Venous blood gas showed a pH of 7.48 with a pCO2 of 56, bicarbonate of 41.  CBC with diff is grossly unremarkable.  His INR is 1.11.  Ethanol level is undetectable.      Chest x-ray, two views, was read as negative chest.      He had the following imaging studies carried out here:  CT of the facial bones and cervical spine.  CT of the facial bones showed right periorbital soft tissue swelling, otherwise head CT showed no acute intracranial abnormality.    Cervical spine CT showed no fracture or posttraumatic subluxation.      An EKG  obtained on him showed the following:  Sinus rhythm at 89 beats per minute with T-wave inversion in anterior lateral leads.  There is no prior EKG available for comparison.    ASSESSMENT AND PLAN:    1.  Acute renal failure, likely prerenal: use of NSAIDs.  We will hydrate him.  We will place a Thompson catheter.  We will monitor I's and O's.  Avoid nephrotoxins.  We will check his BMP periodically.    2.  Mild rhabdomyolysis:  His home medication includes a statin.  We will hold off on that.    3.  Acute encephalopathy, likely due to uremia:  His BUN is elevated at 174.  We will monitor him.  We will recheck ammonia level.    4.  Lactic acidosis:  Likely related to dehydration.  We will trend it.    5.  History of ethanol abuse:  We will monitor him on telemetry.  For now, we will hold off on CIWA protocol; however, if he displays symptoms of withdrawal, we will implement it.  We will have  or case management evaluate him, as his house was noted to be unkempt by EMS.    CODE STATUS:  FULL CODE BY DEFAULT.    DISPOSITION:  He will be admitted as an inpatient.    Emily Levi MD        D: 2022   T: 2022   MT: [id number]    Name:     MAX RANGEL  MRN:      [id number]        Account:     [id number]   :      1960           Admitted:    2022       Document: [id number]

## 2022-07-03 NOTE — CONSULTS
Care Management Note    Length of Stay (days): 0    Expected Discharge Date:       Concerns to be Addressed: discharge planning  Patient plan of care discussed at interdisciplinary rounds: Yes    Anticipated Discharge Disposition:       Anticipated Discharge Services:    Anticipated Discharge DME:      Referrals Placed by CM/SW:    Private pay costs discussed: Not applicable    Additional Information:  Reviewed pt's status and acknowledging consult for SW. Noted per nursing notes, pt is lethargic and disoriented to time/situation. Not appropriate for writer to meet with at this time. Noted PT/OT are ordered and scheduled to assess pt tomorrow (7/4).     SW to follow. Will await therapy recommendations and follow up with pt after to complete full assessment when appropriate.     JARRED Hurd, LSW  Inpatient Care Coordination  MS3, [facility]  [phone]    JARRED Lee

## 2022-07-03 NOTE — ED PROVIDER NOTES
History   Chief Complaint:  Fall       The history is provided by the patient and the EMS personnel.      Arash Corona is a 61 year old male with history of hypertension and CAD who presents with inability to care for self. The patient reports that his son found him on the ground outside. He reports he felt drowsy. EMS reports that the son called EMS for the patient and has concerns for his safety and ability to thrive. The patient reports that he does not drink often, but yesterday consumed a small bottle of alcohol. Patient denies pain and injury. Patient mumbles that his son is taking over his life.    Review of Systems   Psychiatric/Behavioral: Positive for behavioral problems and confusion.   All other systems reviewed and are negative.    Allergies:  Ace Inhibitors  Melatonin    Medications:  Norvasc  Lipitor  Klonopin  Antivert  Toprol  Potassium chloride    Past Medical History:     Hypertension  CAD  Hyperlipidemia  Tobacco use disorder    Past Surgical History:   Angioplasty    Family History:    Mother: Hypertension    Social History:  Patient unaccompanied.  House found to be un-cared for.    Physical Exam     Patient Vitals for the past 24 hrs:   BP Temp Temp src Pulse Resp SpO2   07/03/22 0145 -- -- -- 81 21 96 %   07/03/22 0130 (!) 135/102 -- -- 81 22 97 %   07/03/22 0045 (!) 120/102 -- -- 87 9 94 %   07/03/22 0030 -- -- -- -- -- 98 %   07/03/22 0006 (!) 136/117 97.5  F (36.4  C) Oral 91 16 98 %       Physical Exam  Constitutional:  Oriented to person, place, and time. Disheveled, underweight, dry feces on body.  HENT:   Head:    pressure ulcer and swelling to right Zygomatic arch.   Mouth/Throat:   Oropharynx is clear and moist.   Eyes:    EOM are normal. Pupils are equal, round, and reactive to light.   Neck:    Neck supple.   Cardiovascular:  Normal rate, regular rhythm and normal heart sounds.      Exam reveals no gallop and no friction rub.       No murmur heard.  Pulmonary/Chest:  Effort  normal and breath sounds normal.      No respiratory distress. No wheezes. No rales.      No reproducible chest wall pain.  Abdominal:   Soft. No distension. No tenderness. No rebound and no guarding.   Musculoskeletal:  Normal range of motion.   Neurological:   Alert and oriented to person, place, and time.           Moves all 4 extremities spontaneously       Mumbling, but oriented.  Skin:    No rash noted. No pallor. Ecchymosis to both knees.    Emergency Department Course   ECG  ECG results from 07/02/22   EKG 12-lead, tracing only     Value    Systolic Blood Pressure     Diastolic Blood Pressure     Ventricular Rate 89    Atrial Rate 89    KY Interval 180    QRS Duration 86        QTc 486    P Axis 60    R AXIS 22    T Axis 224    Interpretation ECG      Sinus rhythm  T wave abnormality, consider anterolateral ischemia  Prolonged QT  Abnormal ECG  No previous ECGs available         Imaging:  XR Chest 2 Views   Final Result   IMPRESSION: Negative chest.      Head CT w/o contrast   Final Result   IMPRESSION:   HEAD CT:   1.  No acute intracranial abnormality.   2.  Age-related change.      FACIAL BONE CT:   1.  Right periorbital soft tissue swelling. No fracture.      CERVICAL SPINE CT:   1.  No fracture or posttraumatic subluxation.         CT Facial Bones without Contrast   Final Result   IMPRESSION:   HEAD CT:   1.  No acute intracranial abnormality.   2.  Age-related change.      FACIAL BONE CT:   1.  Right periorbital soft tissue swelling. No fracture.      CERVICAL SPINE CT:   1.  No fracture or posttraumatic subluxation.         Cervical spine CT w/o contrast   Final Result   IMPRESSION:   HEAD CT:   1.  No acute intracranial abnormality.   2.  Age-related change.      FACIAL BONE CT:   1.  Right periorbital soft tissue swelling. No fracture.      CERVICAL SPINE CT:   1.  No fracture or posttraumatic subluxation.           Report per radiology    Laboratory:  Labs Ordered and Resulted from Time of ED  Arrival to Time of ED Departure   COMPREHENSIVE METABOLIC PANEL - Abnormal       Result Value    Sodium 132 (*)     Potassium 3.0 (*)     Chloride 75 (*)     Carbon Dioxide (CO2) 38 (*)     Anion Gap 19 (*)     Urea Nitrogen 174 (*)     Creatinine 5.09 (*)     Calcium 8.3 (*)     Glucose 176 (*)     Alkaline Phosphatase 89       (*)     ALT 69      Protein Total 7.6      Albumin 2.3 (*)     Bilirubin Total 1.2      GFR Estimate 12 (*)    CK TOTAL - Abnormal    CK 2,350 (*)    LACTIC ACID WHOLE BLOOD - Abnormal    Lactic Acid 2.8 (*)    CBC WITH PLATELETS AND DIFFERENTIAL - Abnormal    WBC Count 5.9      RBC Count 4.48      Hemoglobin 14.9      Hematocrit 43.3      MCV 97      MCH 33.3 (*)     MCHC 34.4      RDW 13.3      Platelet Count 154      % Neutrophils 78      % Lymphocytes 9      % Monocytes 11      % Eosinophils 1      % Basophils 0      % Immature Granulocytes 1      NRBCs per 100 WBC 1 (*)     Absolute Neutrophils 4.6      Absolute Lymphocytes 0.5 (*)     Absolute Monocytes 0.6      Absolute Eosinophils 0.0      Absolute Basophils 0.0      Absolute Immature Granulocytes 0.1      Absolute NRBCs 0.1     BLOOD GAS VENOUS WITH OXYHEMOGLOBIN - Abnormal    pH Venous 7.48 (*)     pCO2 Venous 56 (*)     pO2 Venous 44      Bicarbonate Venous 41 (*)     FIO2 0      Oxyhemoglobin Venous 73      Base Excess/Deficit (+/-) 15.1 (*)    ETHYL ALCOHOL LEVEL - Normal    Alcohol ethyl <0.01     TROPONIN I - Normal    Troponin I High Sensitivity 31     INR - Normal    INR 1.11     COVID-19 VIRUS (CORONAVIRUS) BY PCR - Normal    SARS CoV2 PCR Negative     AMMONIA    Ammonia       URINE MACROSCOPIC WITH REFLEX TO MICRO   MAGNESIUM        Emergency Department Course:     Reviewed:  I reviewed nursing notes, vitals, past medical history and Care Everywhere    Assessments:  0003 I obtained history and examined the patient as noted above.   0055 I rechecked the patient and explained findings. Spoke with son.  0105 I  rechecked the patient and explained findings.    Interventions:  0124 NS 1 L with Infuvite Adult 10 mL, thiamine 100 mg, folic acid 1 mg, magnesium sulfate 2 g infusion.  0031 NS 1 L IIV    Disposition:  The patient was admitted to the hospital under the care of Dr. Levi.     Impression & Plan     Medical Decision Makin-year-old male history of alcoholism was found down by his son.  Possibly for the past 3 days.  Son has concerns for his safety and is not able to take care of himself.  House was ill He was found in his own feces.  Differential includes failure to thrive, alcoholism, rhabdomyolysis, or other causes.  Work-up unfortunately shows acute renal failure, elevated lactic acid likely due to dehydration and rhabdomyolysis.  He was retaining urine and Thompson catheter was placed.  He is currently getting rehydrated with fluids here has remained vitally stable throughout his stay.  Will be admitted for further fluid hydration and monitoring.    Critical Care Time: was 35 minutes for this patient excluding procedures    Diagnosis:    ICD-10-CM    1. Acute renal failure, unspecified acute renal failure type (H)  N17.9    2. Non-traumatic rhabdomyolysis  M62.82    3. Alcoholism (H)  F10.20    4. Adult failure to thrive  R62.7        Discharge Medications:  New Prescriptions    No medications on file       Scribe Disclosure:  I, Amy Card, am serving as a scribe at 11:59 PM on 2022 to document services personally performed by Kameron Lo MD based on my observations and the provider's statements to me.            Kameron Lo MD  [phone]

## 2022-07-03 NOTE — ED TRIAGE NOTES
Patient arrives via EMS after being found at home on the floor by his son. Pt has a hx of alcohol abuse and failure to thrive. EMS and patient reports he was on the floor for a few hours, there is a bruise and abrasion on the right side of his face and RLE. Patient denies drinking. , VSS, A&Ox3 (disoriented to date).      Triage Assessment     Row Name 07/03/22 0007       Triage Assessment (Adult)    Airway WDL WDL       Respiratory WDL    Respiratory WDL WDL

## 2022-07-03 NOTE — PLAN OF CARE
Pt A/O to person, time. Forgetful about situation and place. VSS except htn, check parameters. Denies pain at this time. Abrasions to R face, L and R knees; bruises scattered; R eye swollen shut. Thompson in place with adequate output; urine is a dark buster. IVF NS @ 150 ml/hr Waiting for admission to floor. CTM and provide supportive cares.

## 2022-07-03 NOTE — ED NOTES
[facility], [place]  ED Nurse Handoff Report    Arash Corona is a 61 year old male   ED Chief complaint: Fall  . ED Diagnosis:   Final diagnoses:   Acute renal failure, unspecified acute renal failure type (H)   Non-traumatic rhabdomyolysis   Alcoholism (H)   Adult failure to thrive     Allergies:   Allergies   Allergen Reactions     Ace Inhibitors      rash     Melatonin      Shortness of breath       Code Status: Full Code  Activity level - Baseline/Home:  Independent. Activity Level - Current:   Assist X 1. Lift room needed: No. Bariatric: No   Needed: Yes- Speaks Estonian and English, pt declined  in ED.  Isolation: No. Infection: Not Applicable.     Vital Signs:   Vitals:    07/03/22 0030 07/03/22 0045 07/03/22 0130 07/03/22 0145   BP:  (!) 120/102 (!) 135/102    Pulse:  87 81 81   Resp:  9 22 21   Temp:       TempSrc:       SpO2: 98% 94% 97% 96%       Cardiac Rhythm:  ,      Pain level:    Patient confused: Yes. Patient Falls Risk: Yes.   Elimination Status: Urethral catheter (cueva) in place; refer to orders to discontinue as per protocol    Patient Report - Initial Complaint: Patient arrives via EMS after being found at home on the floor by his son. Pt has a hx of alcohol abuse and failure to thrive. EMS and patient reports he was on the floor for a few hours, there is a bruise and abrasion on the right side of his face and RLE. Patient denies drinking. , VSS, A&Ox3 (disoriented to date). . Focused Assessment:    01:20 Musculoskeletal Musculoskeletal (Adult) - Musculoskeletal WDL: .WDL except (C/o increase in falls over the past few months. Son reports they havent been able to get a hold of the pateint for 2 days, unknown how long the patient was on the ground for. Hx of alcohol abuse) HS     01:20 Neuro Cognitive Neuro Cognitive (Adult) - Cognitive/Neuro/Behavioral WDL: .WDL except (Patient confused about date and time, otherwise A&Ox3. )  Veronica Coma Scale (greater than  18 mos) - Best Verbal Response: 4-->(V4) confused  Best Motor Response: 6-->(M6) obeys commands  Eye Openin-->(E4) spontaneous  Veronica Coma Scale Score: 14         Tests Performed: Labs and Imaging. Abnormal Results:   Labs Ordered and Resulted from Time of ED Arrival to Time of ED Departure   COMPREHENSIVE METABOLIC PANEL - Abnormal       Result Value    Sodium 132 (*)     Potassium 3.0 (*)     Chloride 75 (*)     Carbon Dioxide (CO2) 38 (*)     Anion Gap 19 (*)     Urea Nitrogen 174 (*)     Creatinine 5.09 (*)     Calcium 8.3 (*)     Glucose 176 (*)     Alkaline Phosphatase 89       (*)     ALT 69      Protein Total 7.6      Albumin 2.3 (*)     Bilirubin Total 1.2      GFR Estimate 12 (*)    CK TOTAL - Abnormal    CK 2,350 (*)    LACTIC ACID WHOLE BLOOD - Abnormal    Lactic Acid 2.8 (*)    CBC WITH PLATELETS AND DIFFERENTIAL - Abnormal    WBC Count 5.9      RBC Count 4.48      Hemoglobin 14.9      Hematocrit 43.3      MCV 97      MCH 33.3 (*)     MCHC 34.4      RDW 13.3      Platelet Count 154      % Neutrophils 78      % Lymphocytes 9      % Monocytes 11      % Eosinophils 1      % Basophils 0      % Immature Granulocytes 1      NRBCs per 100 WBC 1 (*)     Absolute Neutrophils 4.6      Absolute Lymphocytes 0.5 (*)     Absolute Monocytes 0.6      Absolute Eosinophils 0.0      Absolute Basophils 0.0      Absolute Immature Granulocytes 0.1      Absolute NRBCs 0.1     BLOOD GAS VENOUS WITH OXYHEMOGLOBIN - Abnormal    pH Venous 7.48 (*)     pCO2 Venous 56 (*)     pO2 Venous 44      Bicarbonate Venous 41 (*)     FIO2 0      Oxyhemoglobin Venous 73      Base Excess/Deficit (+/-) 15.1 (*)    ETHYL ALCOHOL LEVEL - Normal    Alcohol ethyl <0.01     TROPONIN I - Normal    Troponin I High Sensitivity 31     INR - Normal    INR 1.11     COVID-19 VIRUS (CORONAVIRUS) BY PCR - Normal    SARS CoV2 PCR Negative     AMMONIA    Ammonia       URINE MACROSCOPIC WITH REFLEX TO MICRO   MAGNESIUM     XR Chest 2 Views   Final  Result   IMPRESSION: Negative chest.      Head CT w/o contrast   Final Result   IMPRESSION:   HEAD CT:   1.  No acute intracranial abnormality.   2.  Age-related change.      FACIAL BONE CT:   1.  Right periorbital soft tissue swelling. No fracture.      CERVICAL SPINE CT:   1.  No fracture or posttraumatic subluxation.         CT Facial Bones without Contrast   Final Result   IMPRESSION:   HEAD CT:   1.  No acute intracranial abnormality.   2.  Age-related change.      FACIAL BONE CT:   1.  Right periorbital soft tissue swelling. No fracture.      CERVICAL SPINE CT:   1.  No fracture or posttraumatic subluxation.         Cervical spine CT w/o contrast   Final Result   IMPRESSION:   HEAD CT:   1.  No acute intracranial abnormality.   2.  Age-related change.      FACIAL BONE CT:   1.  Right periorbital soft tissue swelling. No fracture.      CERVICAL SPINE CT:   1.  No fracture or posttraumatic subluxation.         .   Treatments provided: See MAR  Family Comments: Darren Antoine at bedside in ED  OBS brochure/video discussed/provided to patient:  N/A  ED Medications:   Medications   0.9% sodium chloride BOLUS (1,000 mLs Intravenous New Bag 7/3/22 0031)   sodium chloride 0.9 % 1,000 mL with Infuvite Adult 10 mL, thiamine 100 mg, folic acid 1 mg, magnesium sulfate 2 g infusion ( Intravenous New Bag 7/3/22 0124)     Drips infusing:  No  For the majority of the shift, the patient's behavior Green. Interventions performed were N/A.    Sepsis treatment initiated: No     Patient tested for COVID 19 prior to admission: YES- negative    ED Nurse Name/Phone Number: Radha Porter RN,   2:36 AM    RECEIVING UNIT ED HANDOFF REVIEW    Above ED Nurse Handoff Report was reviewed: Yes  Reviewed by: Josephine Flowers RN on July 3, 2022 at 12:32 PM

## 2022-07-03 NOTE — ED NOTES
MD ordered oral medications. RN asked patient if he would be able to take any oral medications at this time and patient stated he couldn't. MD notified.

## 2022-07-03 NOTE — PLAN OF CARE
"Pt up from ED approx 1305, oriented to room and staff. Pt refused offered  via ipad, verbalizes understanding of English. Disoriented to situation. VSS on 1L O2 per NC except elevated BP. Initially c/o \"all over\" pain, PRN tylenol offered and pt refused stating, \"I don't have pain.\" Tele SR w/ prolong QT / occ PVCs, denied CP. PIV to right intact, infusing w/ LR @ 150 ml/hr. Ax2 lift for unsuccessful pt attempt to sit up and ambulate from ED cart to bed d/t reported weakness. Thompson patent and intact. Generalized ecchymosis, abrasions, and scabs w/ right periorbital swelling. Family at bedside, received update from MD. Will continue POC.     Goal Outcome Evaluation:    Plan of Care Reviewed With: patient, family     Overall Patient Progress: no change           "

## 2022-07-03 NOTE — PROGRESS NOTES
Last /108. Scheduled amlodipine given, metoprolol was not verified yet. Patient is lethargic, d/o to time and situation. Denies pain. Clear liquid diet, tolerating sips of water with pills. IVF infusing. Thompson in place. Abdominal CT completed and brain MRI completed. CIWA 3 for disorientation to date. Last potassium 3.5 and lactic 2.8 (provider aware see his note). Tele - SR. Patient transferred to room 322 after mri.

## 2022-07-03 NOTE — PROGRESS NOTES
[facility]  Hospitalist Progress Note     Assessment & Plan     ASSESSMENT:    61M with hx of alcoholism, CAD s/p PCI, and HTN presents with multiple issues as described below after being found down at home.    PLAN:  -Found Down at Home and Concern for Stroke: Suspect fall was 2/2 alcoholism but also has RLE weakness. W/u per below. PT/OT consults.  -Concern for Stroke: Patient has focal RLE weakness. Outside tpa window. ASA 325mg x1 then 81mg every day thereafter. MRI brain.  -Acute Renal Failure w/ Rhabdomyolysis: Aggressive IVF. Trend.  -Alcoholic Pancreatitis: IVF. Pain control. CLD.  -Alcohol Withdrawal: CIWA.  -Alcoholism:  cessation.  -Essential HTN: Home Norvasc and metop.  -CAD s/p PCI: ASA + metop. Hold statin in setting of rhabdo.   -DVT Prophy: Heparin.  -Disposition: Undetermined. PT/OT consults.      Juan David Carlisle MD    Subjective     Patient seen at bedside. More alert this morning but not very cooperative with interview. Has right leg weakness - unclear how long this has been present but patient not able to pick leg off the bed. Denies UE weakness, numbness, tingling, or facial droop.        Objective   Blood pressure (!) 150/91, pulse 81, temperature 97.8  F (36.6  C), temperature source Oral, resp. rate 16, SpO2 93 %.    PHYSICAL EXAM  General: In no acute distress  HEENT: Abrasions and contusion to right side of face and right knee.  CV: RRR.  Lungs: CTAB. Nl WOB.  Abd: Non-tender.  Ext: No edema.  Neuro: Focal RLE weakness 3/5 strength. Rest of neuro exam wnl.    LABS AND IMAGING: Reviewed and pertinent results discussed in assessment and plan.

## 2022-07-04 ENCOUNTER — APPOINTMENT (OUTPATIENT)
Dept: PHYSICAL THERAPY | Facility: CLINIC | Age: 62
End: 2022-07-04
Attending: INTERNAL MEDICINE
Payer: COMMERCIAL

## 2022-07-04 LAB
ALBUMIN SERPL-MCNC: 1.7 G/DL (ref 3.4–5)
ALP SERPL-CCNC: 69 U/L (ref 40–150)
ALT SERPL W P-5'-P-CCNC: 42 U/L (ref 0–70)
ANION GAP SERPL CALCULATED.3IONS-SCNC: 7 MMOL/L (ref 3–14)
ANION GAP SERPL CALCULATED.3IONS-SCNC: 8 MMOL/L (ref 3–14)
AST SERPL W P-5'-P-CCNC: 77 U/L (ref 0–45)
BILIRUB SERPL-MCNC: 0.5 MG/DL (ref 0.2–1.3)
BUN SERPL-MCNC: 115 MG/DL (ref 7–30)
BUN SERPL-MCNC: 123 MG/DL (ref 7–30)
CALCIUM SERPL-MCNC: 8.5 MG/DL (ref 8.5–10.1)
CALCIUM SERPL-MCNC: 8.5 MG/DL (ref 8.5–10.1)
CHLORIDE BLD-SCNC: 94 MMOL/L (ref 94–109)
CHLORIDE BLD-SCNC: 97 MMOL/L (ref 94–109)
CO2 SERPL-SCNC: 39 MMOL/L (ref 20–32)
CO2 SERPL-SCNC: 39 MMOL/L (ref 20–32)
CREAT SERPL-MCNC: 2.11 MG/DL (ref 0.66–1.25)
CREAT SERPL-MCNC: 2.44 MG/DL (ref 0.66–1.25)
GFR SERPL CREATININE-BSD FRML MDRD: 29 ML/MIN/1.73M2
GFR SERPL CREATININE-BSD FRML MDRD: 35 ML/MIN/1.73M2
GLUCOSE BLD-MCNC: 157 MG/DL (ref 70–99)
GLUCOSE BLD-MCNC: 170 MG/DL (ref 70–99)
HOLD SPECIMEN: NORMAL
MAGNESIUM SERPL-MCNC: 2.2 MG/DL (ref 1.6–2.3)
POTASSIUM BLD-SCNC: 2.8 MMOL/L (ref 3.4–5.3)
POTASSIUM BLD-SCNC: 3.6 MMOL/L (ref 3.4–5.3)
PROT SERPL-MCNC: 5.6 G/DL (ref 6.8–8.8)
SODIUM SERPL-SCNC: 141 MMOL/L (ref 133–144)
SODIUM SERPL-SCNC: 143 MMOL/L (ref 133–144)

## 2022-07-04 PROCEDURE — 82310 ASSAY OF CALCIUM: CPT | Performed by: INTERNAL MEDICINE

## 2022-07-04 PROCEDURE — 83735 ASSAY OF MAGNESIUM: CPT | Performed by: INTERNAL MEDICINE

## 2022-07-04 PROCEDURE — 80053 COMPREHEN METABOLIC PANEL: CPT | Performed by: INTERNAL MEDICINE

## 2022-07-04 PROCEDURE — 120N000001 HC R&B MED SURG/OB

## 2022-07-04 PROCEDURE — 99233 SBSQ HOSP IP/OBS HIGH 50: CPT | Performed by: INTERNAL MEDICINE

## 2022-07-04 PROCEDURE — 250N000013 HC RX MED GY IP 250 OP 250 PS 637: Performed by: INTERNAL MEDICINE

## 2022-07-04 PROCEDURE — 258N000003 HC RX IP 258 OP 636: Performed by: INTERNAL MEDICINE

## 2022-07-04 PROCEDURE — 97161 PT EVAL LOW COMPLEX 20 MIN: CPT | Mod: GP

## 2022-07-04 PROCEDURE — 82040 ASSAY OF SERUM ALBUMIN: CPT | Performed by: INTERNAL MEDICINE

## 2022-07-04 PROCEDURE — 36415 COLL VENOUS BLD VENIPUNCTURE: CPT | Performed by: INTERNAL MEDICINE

## 2022-07-04 PROCEDURE — 97530 THERAPEUTIC ACTIVITIES: CPT | Mod: GP

## 2022-07-04 RX ORDER — POLYETHYLENE GLYCOL 3350 17 G
2 POWDER IN PACKET (EA) ORAL
Status: DISCONTINUED | OUTPATIENT
Start: 2022-07-04 | End: 2022-07-10 | Stop reason: HOSPADM

## 2022-07-04 RX ORDER — SODIUM CHLORIDE, SODIUM LACTATE, POTASSIUM CHLORIDE, CALCIUM CHLORIDE 600; 310; 30; 20 MG/100ML; MG/100ML; MG/100ML; MG/100ML
INJECTION, SOLUTION INTRAVENOUS CONTINUOUS
Status: DISCONTINUED | OUTPATIENT
Start: 2022-07-04 | End: 2022-07-05

## 2022-07-04 RX ORDER — POTASSIUM CHLORIDE 1.5 G/1.58G
40 POWDER, FOR SOLUTION ORAL ONCE
Status: COMPLETED | OUTPATIENT
Start: 2022-07-04 | End: 2022-07-04

## 2022-07-04 RX ORDER — NICOTINE 21 MG/24HR
1 PATCH, TRANSDERMAL 24 HOURS TRANSDERMAL DAILY
Status: DISCONTINUED | OUTPATIENT
Start: 2022-07-04 | End: 2022-07-10 | Stop reason: HOSPADM

## 2022-07-04 RX ADMIN — THIAMINE HCL TAB 100 MG 100 MG: 100 TAB at 09:27

## 2022-07-04 RX ADMIN — POTASSIUM CHLORIDE 40 MEQ: 1.5 POWDER, FOR SOLUTION ORAL at 09:27

## 2022-07-04 RX ADMIN — SODIUM CHLORIDE, POTASSIUM CHLORIDE, SODIUM LACTATE AND CALCIUM CHLORIDE: 600; 310; 30; 20 INJECTION, SOLUTION INTRAVENOUS at 02:57

## 2022-07-04 RX ADMIN — METOPROLOL SUCCINATE 25 MG: 25 TABLET, EXTENDED RELEASE ORAL at 09:27

## 2022-07-04 RX ADMIN — SODIUM CHLORIDE, POTASSIUM CHLORIDE, SODIUM LACTATE AND CALCIUM CHLORIDE: 600; 310; 30; 20 INJECTION, SOLUTION INTRAVENOUS at 23:56

## 2022-07-04 RX ADMIN — SODIUM CHLORIDE, POTASSIUM CHLORIDE, SODIUM LACTATE AND CALCIUM CHLORIDE: 600; 310; 30; 20 INJECTION, SOLUTION INTRAVENOUS at 16:58

## 2022-07-04 RX ADMIN — MULTIPLE VITAMINS W/ MINERALS TAB 1 TABLET: TAB at 09:27

## 2022-07-04 RX ADMIN — SODIUM CHLORIDE, POTASSIUM CHLORIDE, SODIUM LACTATE AND CALCIUM CHLORIDE: 600; 310; 30; 20 INJECTION, SOLUTION INTRAVENOUS at 13:15

## 2022-07-04 RX ADMIN — ASPIRIN 81 MG CHEWABLE TABLET 81 MG: 81 TABLET CHEWABLE at 09:27

## 2022-07-04 RX ADMIN — FOLIC ACID 1 MG: 1 TABLET ORAL at 09:27

## 2022-07-04 RX ADMIN — AMLODIPINE BESYLATE 10 MG: 10 TABLET ORAL at 09:27

## 2022-07-04 RX ADMIN — SODIUM CHLORIDE, POTASSIUM CHLORIDE, SODIUM LACTATE AND CALCIUM CHLORIDE: 600; 310; 30; 20 INJECTION, SOLUTION INTRAVENOUS at 09:27

## 2022-07-04 RX ADMIN — NICOTINE 1 PATCH: 21 PATCH, EXTENDED RELEASE TRANSDERMAL at 14:27

## 2022-07-04 ASSESSMENT — ACTIVITIES OF DAILY LIVING (ADL)
ADLS_ACUITY_SCORE: 49
ADLS_ACUITY_SCORE: 36
ADLS_ACUITY_SCORE: 36
CONCENTRATING,_REMEMBERING_OR_MAKING_DECISIONS_DIFFICULTY: NO
WEAR_GLASSES_OR_BLIND: NO
DEPENDENT_IADLS:: INDEPENDENT
ADLS_ACUITY_SCORE: 36
ADLS_ACUITY_SCORE: 31
ADLS_ACUITY_SCORE: 31
ADLS_ACUITY_SCORE: 36
ADLS_ACUITY_SCORE: 36
ADLS_ACUITY_SCORE: 31
ADLS_ACUITY_SCORE: 31
ADLS_ACUITY_SCORE: 32
ADLS_ACUITY_SCORE: 36

## 2022-07-04 NOTE — PROGRESS NOTES
07/04/22 1115   Quick Adds   Type of Visit Initial PT Evaluation   Living Environment   People in Home alone   Current Living Arrangements apartment   Living Environment Comments Patient poor historian & providing minimal information. Notes doing all household tasks by himself.   Self-Care   Usual Activity Tolerance moderate   Current Activity Tolerance poor   Equipment Currently Used at Home none   Fall history within last six months yes   Number of times patient has fallen within last six months 1  (at least 1, reason of admit)   Activity/Exercise/Self-Care Comment It appears that at baseline, patient is IND with functional mobility.   General Information   Onset of Illness/Injury or Date of Surgery 07/03/22   Referring Physician Emily Levi MD   Patient/Family Therapy Goals Statement (PT) To return to home   Pertinent History of Current Problem (include personal factors and/or comorbidities that impact the POC) 61M with hx of alcoholism, CAD s/p PCI, and HTN presents with multiple issues as described below after being found down at home.   Existing Precautions/Restrictions fall   General Observations Greeted patient up in recliner.   Cognition   Affect/Mental Status (Cognition) agitated;flat/blunted affect   Orientation Status (Cognition) oriented to;person   Follows Commands (Cognition) follows one-step commands;50-74% accuracy;physical/tactile prompts required;repetition of directions required;verbal cues/prompting required   Safety Deficit (Cognition) awareness of need for assistance;insight into deficits/self-awareness;judgment   Integumentary/Edema   Integumentary/Edema Comments scabs on legs   Posture    Posture Forward head position;Protracted shoulders   Range of Motion (ROM)   ROM Comment B LE WFL   Strength (Manual Muscle Testing)   Strength Comments B LE functionally weak; greater weakness on R side   Bed Mobility   Comment, (Bed Mobility) EOB > supine at maxAx2   Transfers   Comment,  (Transfers) sit <> stand via sarasteady at modAx2   Gait/Stairs (Locomotion)   Comment, (Gait/Stairs) unable to perform   Balance   Balance Comments unsteady on feet, requires B UE support   Clinical Impression   Criteria for Skilled Therapeutic Intervention Yes, treatment indicated   PT Diagnosis (PT) impaired functional mobility   Influenced by the following impairments LE weakness, impaired balance, decreased activity tolerance   Functional limitations due to impairments bed mobility, transfers, ambulation   Clinical Presentation (PT Evaluation Complexity) Evolving/Changing   Clinical Presentation Rationale PMH, current presentation, clinical judgement   Clinical Decision Making (Complexity) low complexity   Planned Therapy Interventions (PT) balance training;bed mobility training;gait training;home exercise program;neuromuscular re-education;patient/family education;stair training;strengthening;transfer training;progressive activity/exercise   Anticipated Equipment Needs at Discharge (PT) walker, rolling   Risk & Benefits of therapy have been explained evaluation/treatment results reviewed;care plan/treatment goals reviewed;patient   PT Discharge Planning   PT Discharge Recommendation (DC Rec) Transitional Care Facility   PT Rationale for DC Rec PT - Patient is far below baseline for functional mobility currently requiring Ax2 & use of sarasteady for transfers. Patient would benefit from continued physical therapy in the setting of a TCU for improving functional mobility, LE strength and tolerance for functional activities in order to return to baseline of functioning.   Total Evaluation Time   Total Evaluation Time (Minutes) 5   Physical Therapy Goals   PT Frequency 5x/week   PT Predicted Duration/Target Date for Goal Attainment 07/08/22   PT Goals Bed Mobility;Transfers;Gait   PT: Bed Mobility Minimal assist;Supine to/from sit;Rolling   PT: Transfers Sit to/from stand;Bed to/from chair;Assistive device;Minimal  assist   PT: Gait Rolling walker;25 feet

## 2022-07-04 NOTE — PLAN OF CARE
"Pt uncooperative in answering orientation questions, refused attempts to provide education on importance of assessment. Pt verbalized throughout shift how \"not pleased I am by the service here.\" Pt also made multiple comments about smoking inside and outside the hospital, and when told that that is not allowed pt became very agitated; MD paged and nicotine patch orders placed. VSS on 1L O2 per NC. Denied pain, states \"I never have pain.\" Tele SR, denied CP. PIV to right intact, infusing w/ LR @ 125 ml/hr. Thompson patent and intact w/ good OP. Up to chair for breakfast Ax2 GB W, pivots w/ a lot of direction. Will continue POC.     Goal Outcome Evaluation:    Plan of Care Reviewed With: patient     Overall Patient Progress: no change           "

## 2022-07-04 NOTE — PROGRESS NOTES
"Care Management Initial Consult    General Information  Assessment completed with: Patient, pt- Arash  Type of CM/SW Visit: Initial Assessment    Primary Care Provider verified and updated as needed: Yes   Readmission within the last 30 days: no previous admission in last 30 days      Reason for Consult: discharge planning  Advance Care Planning: Advance Care Planning Reviewed: no concerns identified        Communication Assessment  Patient's communication style: spoken language (English or Bilingual)    Hearing Difficulty or Deaf: no   Wear Glasses or Blind: no    Cognitive  Cognitive/Neuro/Behavioral: .WDL except, mood/behavior  Level of Consciousness: alert  Arousal Level: opens eyes spontaneously  Orientation:  (pt noncompliant in answering questions stating \"why should I tell you?\" education provided about orientation q's as part of assessment, pt still uncooperative w/ participation)  Mood/Behavior: uncooperative  Best Language: 0 - No aphasia  Speech: slow, clear    Living Environment:   People in home: alone     Current living Arrangements: apartment      Able to return to prior arrangements: yes    Family/Social Support:  Care provided by: self  Provides care for: no one     Children, Sibling(s)          Description of Support System:         Current Resources:   Patient receiving home care services: No     Community Resources: None  Equipment currently used at home: none  Supplies currently used at home: None    Employment/Financial:  Employment Status:          Financial Concerns: No concerns identified   Referral to Financial Worker: No     Lifestyle & Psychosocial Needs:  Social Determinants of Health     Tobacco Use: Medium Risk     Smoking Tobacco Use: Former Smoker     Smokeless Tobacco Use: Never Used   Alcohol Use: Not on file   Financial Resource Strain: Not on file   Food Insecurity: Not on file   Transportation Needs: Not on file   Physical Activity: Not on file   Stress: Not on file   Social " "Connections: Not on file   Intimate Partner Violence: Not on file   Depression: Not at risk     PHQ-2 Score: 0   Housing Stability: Not on file     Functional Status:  Prior to admission patient needed assistance:   Dependent ADLs:: Independent  Dependent IADLs:: Independent     Mental Health Status:  Mental Health Status: No Current Concerns       Chemical Dependency Status:  Chemical Dependency Status: No Current Concerns  - pt denies current concerns    Values/Beliefs:  Spiritual, Cultural Beliefs, Latter day Practices, Values that affect care:  n/a             Additional Information:  Patient is 61 year old male admitted on 7/3/2022 with a chief complaint of \"Fall, history of alcohol abuse, failure to thrive\" (per H&P). Chart reviewed. Noted PT assessed pt and currently recommending TCU at discharge as pt is requiring an assist of 2 and jeremiah brush. Met with patient this date to discuss discharge planning. Introduced self and social work role. Patient presented sitting up in bed, appeared alert and oriented and agreeable to talk with writer. Pt reports not remembering what caused his fall. SW inquired if he felt drinking was a contribution and pt said it may be part of the reason but he stated he feels it was a medical reason he fell.     SW shared therapies recommendation for TCU. SW provided education on what TCU entails and pt is not interested. Pt became a bit argumentative stating he has always been independent and he does not need help from anyone. SW explained TCU would be temporary and a way for pt to gain strength to be able to remain independent. Pt declined stating he will be going home. Offered home PT and pt reluctantly agreeable.     Social work will continue to follow and assist with discharge planning as needed. Will arrange home PT closer to discharge.     JARRED Hurd, LSW  Inpatient Care Coordination  MS3, [facility]  [phone]    JARRED Lee      "

## 2022-07-04 NOTE — PLAN OF CARE
"Goal Outcome Evaluation:    Vitals: /77 (BP Location: Left arm)   Pulse 80   Temp 98.3  F (36.8  C) (Oral)   Resp 16   Ht 1.93 m (6' 4\")   Wt 46.4 kg (102 lb 4.8 oz)   SpO2 96%   BMI 12.45 kg/m      Primary DX: ETOH/ fall  Orientation: disoriented to situation  Pertinent: There are meds in the lock box in his room. Family refused to take them home yesterday, patient not understanding about sending them to pharmacy. Please address with family when here again.  Pain: Denies.  Neuro: Stable  Respiratory: LS clear but diminished. HERNANDEZ. No cough noted or reported.   Cardiac/Tele: SR  Bowel Sounds: +X4Q. ABD soft, non-tender.   GI/: No bm this shift. Thompson patent. Adequate output. Urine buster.   Skin: Face, head and LEs. Abrasions and scabs. R periorbital swelling.   LDA: R PIV infusing LR at 150. Tolerating well.  Diet: Regular  Activity: AX2  Followed By/Consults: Soc wk  Plan: Continue current POC.                         "

## 2022-07-04 NOTE — PROGRESS NOTES
"[facility]  Hospitalist Progress Note     Assessment & Plan     ASSESSMENT:    61M with hx of alcoholism, CAD s/p PCI, and HTN presents with multiple issues as described below after being found down at home. Gradually improving with IVF and supportive care although awaiting PT/OT evals as patient is severely deconditioned which may limit discharge.     PLAN:  -Found Down at Home: Suspect fall was 2/2 alcoholism. Concern for stroke but MRI neg for acute infarct. PT/OT consults.  -Acute Renal Failure w/ Rhabdomyolysis: Aggressive IVF. Trend.  -Alcoholic Pancreatitis: IVF. Pain control. Advance diet as tolerated.  -Alcohol Withdrawal: CIWA.  -Alcoholism:  cessation.  -Chronic Stroke: Evident on MRI. ASA. Statin once Ck trends down.  -Essential HTN: Home Norvasc and metop.  -CAD s/p PCI: ASA + metop. Hold statin in setting of rhabdo.   -DVT Prophy: Heparin.  -Disposition: Undetermined. PT/OT consults.      Juan David Carlisle MD    Subjective     Patient seen at bedside. Much improved with regards to mentation. Denies abdominal pain or nausea and wants to eat. Case was discussed with family at bedside yesterday evening. Case discussed with RN this morning as well.        Objective   Blood pressure 118/80, pulse 79, temperature 97.8  F (36.6  C), temperature source Oral, resp. rate 16, height 1.93 m (6' 4\"), weight 46.4 kg (102 lb 4.8 oz), SpO2 98 %.    PHYSICAL EXAM  General: In no acute distress  HEENT: Abrasions and contusion to right side of face and right knee.  CV: RRR.  Lungs: CTAB. Nl WOB.  Abd: Non-tender.  Ext: No edema.    LABS AND IMAGING: Reviewed and pertinent results discussed in assessment and plan.    "

## 2022-07-04 NOTE — PLAN OF CARE
Goal Outcome Evaluation:    Plan of Care Reviewed With: patient     Overall Patient Progress: no change      VSS.  Patient A&Ox2 on 1 O2 at 1 Lpm via NC.  Pt denies pain and CP.  Offered  and pt declined.  Thompson cath draining with good out put.  LR running at 150 mL/hr in right PIV.  Patient sleeping most of shift.  Tele SR, with prolonged Qt.  Ax2 with lift.  CIWA scores of 1 and 1. Swelling over right eye, and generalized scabs, abrasion, and ecchymosis.  Cath cares completed.  Continue with POC.

## 2022-07-04 NOTE — PROVIDER NOTIFICATION
"MD paged [phone]: \"Good morning! Pt's AM K+ 2.8, not on replacement protocol. Do you want to start him on replacement protocol or give one time dose w/ recheck?\"  "

## 2022-07-05 ENCOUNTER — APPOINTMENT (OUTPATIENT)
Dept: OCCUPATIONAL THERAPY | Facility: CLINIC | Age: 62
End: 2022-07-05
Attending: INTERNAL MEDICINE
Payer: COMMERCIAL

## 2022-07-05 LAB
ANION GAP SERPL CALCULATED.3IONS-SCNC: 8 MMOL/L (ref 3–14)
ATRIAL RATE - MUSE: 89 BPM
BUN SERPL-MCNC: 79 MG/DL (ref 7–30)
CALCIUM SERPL-MCNC: 9.1 MG/DL (ref 8.5–10.1)
CHLORIDE BLD-SCNC: 102 MMOL/L (ref 94–109)
CO2 SERPL-SCNC: 35 MMOL/L (ref 20–32)
CREAT SERPL-MCNC: 1.56 MG/DL (ref 0.66–1.25)
DIASTOLIC BLOOD PRESSURE - MUSE: NORMAL MMHG
GFR SERPL CREATININE-BSD FRML MDRD: 50 ML/MIN/1.73M2
GLUCOSE BLD-MCNC: 169 MG/DL (ref 70–99)
INTERPRETATION ECG - MUSE: NORMAL
P AXIS - MUSE: 60 DEGREES
POTASSIUM BLD-SCNC: 3.2 MMOL/L (ref 3.4–5.3)
PR INTERVAL - MUSE: 180 MS
QRS DURATION - MUSE: 86 MS
QT - MUSE: 400 MS
QTC - MUSE: 486 MS
R AXIS - MUSE: 22 DEGREES
SODIUM SERPL-SCNC: 145 MMOL/L (ref 133–144)
SYSTOLIC BLOOD PRESSURE - MUSE: NORMAL MMHG
T AXIS - MUSE: 224 DEGREES
VENTRICULAR RATE- MUSE: 89 BPM

## 2022-07-05 PROCEDURE — 80048 BASIC METABOLIC PNL TOTAL CA: CPT | Performed by: INTERNAL MEDICINE

## 2022-07-05 PROCEDURE — 250N000013 HC RX MED GY IP 250 OP 250 PS 637: Performed by: INTERNAL MEDICINE

## 2022-07-05 PROCEDURE — 120N000001 HC R&B MED SURG/OB

## 2022-07-05 PROCEDURE — 97535 SELF CARE MNGMENT TRAINING: CPT | Mod: GO | Performed by: OCCUPATIONAL THERAPIST

## 2022-07-05 PROCEDURE — 99233 SBSQ HOSP IP/OBS HIGH 50: CPT | Performed by: INTERNAL MEDICINE

## 2022-07-05 PROCEDURE — 258N000003 HC RX IP 258 OP 636: Performed by: INTERNAL MEDICINE

## 2022-07-05 PROCEDURE — 36415 COLL VENOUS BLD VENIPUNCTURE: CPT | Performed by: INTERNAL MEDICINE

## 2022-07-05 PROCEDURE — 97166 OT EVAL MOD COMPLEX 45 MIN: CPT | Mod: GO | Performed by: OCCUPATIONAL THERAPIST

## 2022-07-05 RX ORDER — POTASSIUM CHLORIDE 1.5 G/1.58G
20 POWDER, FOR SOLUTION ORAL ONCE
Status: COMPLETED | OUTPATIENT
Start: 2022-07-05 | End: 2022-07-05

## 2022-07-05 RX ORDER — ATORVASTATIN CALCIUM 40 MG/1
40 TABLET, FILM COATED ORAL DAILY
Status: DISCONTINUED | OUTPATIENT
Start: 2022-07-05 | End: 2022-07-10 | Stop reason: HOSPADM

## 2022-07-05 RX ADMIN — AMLODIPINE BESYLATE 10 MG: 10 TABLET ORAL at 10:18

## 2022-07-05 RX ADMIN — THIAMINE HCL TAB 100 MG 100 MG: 100 TAB at 10:18

## 2022-07-05 RX ADMIN — FOLIC ACID 1 MG: 1 TABLET ORAL at 10:18

## 2022-07-05 RX ADMIN — ASPIRIN 81 MG CHEWABLE TABLET 81 MG: 81 TABLET CHEWABLE at 10:18

## 2022-07-05 RX ADMIN — LORAZEPAM 1 MG: 1 TABLET ORAL at 20:12

## 2022-07-05 RX ADMIN — NICOTINE 1 PATCH: 21 PATCH, EXTENDED RELEASE TRANSDERMAL at 10:18

## 2022-07-05 RX ADMIN — MULTIPLE VITAMINS W/ MINERALS TAB 1 TABLET: TAB at 10:18

## 2022-07-05 RX ADMIN — METOPROLOL SUCCINATE 25 MG: 25 TABLET, EXTENDED RELEASE ORAL at 10:18

## 2022-07-05 RX ADMIN — ATORVASTATIN CALCIUM 40 MG: 40 TABLET, FILM COATED ORAL at 10:18

## 2022-07-05 RX ADMIN — POTASSIUM CHLORIDE 20 MEQ: 1.5 POWDER, FOR SOLUTION ORAL at 10:17

## 2022-07-05 RX ADMIN — SODIUM CHLORIDE, POTASSIUM CHLORIDE, SODIUM LACTATE AND CALCIUM CHLORIDE: 600; 310; 30; 20 INJECTION, SOLUTION INTRAVENOUS at 07:23

## 2022-07-05 RX ADMIN — LORAZEPAM 1 MG: 1 TABLET ORAL at 16:05

## 2022-07-05 ASSESSMENT — ACTIVITIES OF DAILY LIVING (ADL)
ADLS_ACUITY_SCORE: 35
ADLS_ACUITY_SCORE: 31
ADLS_ACUITY_SCORE: 31
ADLS_ACUITY_SCORE: 34
PREVIOUS_RESPONSIBILITIES: MEAL PREP;HOUSEKEEPING;LAUNDRY;SHOPPING;MEDICATION MANAGEMENT;FINANCES
ADLS_ACUITY_SCORE: 33
ADLS_ACUITY_SCORE: 30
ADLS_ACUITY_SCORE: 32
ADLS_ACUITY_SCORE: 35
ADLS_ACUITY_SCORE: 32
ADLS_ACUITY_SCORE: 34

## 2022-07-05 NOTE — PLAN OF CARE
16:05   When asked about his alcohol use, pt said he drinks daily.  His wife, who was visiting a the time, said he consumes a heavy amount of alcohol each day and has lived alone, away from family for 2 years because of this.  They have 7 adult children together, 2 grand kids and a great grand child. (The wife says she lives with one of the daughters not with her )  Wife said patient does not interact with any of the family due to his alcohol use.  Pt became irritated with his wife when she was disclosing this information.  They proceeded to have a conversation in [place].     19:45 No c/o pain this shift.  He is alert and oriented to self and place. He can be irritable and impulsive at times.  He does not always use the call light before getting out of bed, even with reminders. Ambulates with unsteady gait, with assist of 1 gait belt and walker.  CIWA protocol per orders Lungs clear.  95% RA.  Tele: SR.  Regular diet.  Voiding without difficutly using a urinal.  He was continent this shift, but he can have episodes of incontinence at times.  PIV saline locked. Bruises/abrasions on face, swollen right eye, scattered bruises/abrasions/scabs from vale fall(s) at home.  Shower given this shift.  Bed extender added to bed.  PT/OT following.  Pt said he is not interested in going to TCU at discharge.

## 2022-07-05 NOTE — PROGRESS NOTES
07/05/22 1400   Quick Adds   Type of Visit Initial Occupational Therapy Evaluation   Living Environment   People in Home spouse   Current Living Arrangements apartment   Living Environment Comments Pt appears to be a poor historian and is limited in the home set up information he is willing to give. Reports he lives with his wife, is unsure if children are able to assist. Reports previous independence with ADLs and mobility without a gait aid but also endorses multiple falls   Self-Care   Usual Activity Tolerance moderate   Current Activity Tolerance poor   Regular Exercise No   Equipment Currently Used at Home none   Fall history within last six months yes   Number of times patient has fallen within last six months 1  (patient reports he has fallen multiple times)   Activity/Exercise/Self-Care Comment Pt reports he is independent with ADLs and mobility at baselinel this appears questionable given multiple falls and cognitive decline   Instrumental Activities of Daily Living (IADL)   Previous Responsibilities meal prep;housekeeping;laundry;shopping;medication management;finances   IADL Comments Unclear if patient was truly living with his spouse   General Information   Onset of Illness/Injury or Date of Surgery 07/02/22   Referring Physician Emily Levi MD   Patient/Family Therapy Goal Statement (OT) Patient would like to return home   Additional Occupational Profile Info/Pertinent History of Current Problem 61M with hx of alcoholism, CAD s/p PCI, and HTN presents with multiple issues as described below after being found down at home. Gradually improving with IVF and supportive care although patient severely deconditioned and PT/OT recommending TCU, which patient is refusing. Will discuss further with patient's son and SW today.   Existing Precautions/Restrictions fall   Limitations/Impairments safety/cognitive   Cognitive Status Examination   Orientation Status person;place  (Pt able to report year with  cuing, correct month but unable to state day of the week or date)   Behavioral Issues overwhelmed easily;uncooperative   Affect/Mental Status (Cognitive) agitated;confused;emotionally labile   Follows Commands follows one-step commands;50-74% accuracy  (Pt non-compliant with safety based commands)   Safety Deficit severe deficit;at risk behavior observed;awareness of need for assistance;impulsivity;insight into deficits/self-awareness;judgment   Memory Deficit moderate deficit   Attention Deficit moderate deficit   Executive Function Deficit moderate deficit   Cognitive Status Comments Pt scored -10 on Short Blessed (of note, pt first language is not English which may affect the results of the assessment) indicating significant cognitive impairment. Patient demonstrated at risk behavior during session almost resulting in a fall   Visual Perception   Visual Impairment/Limitations corrective lenses full-time   Sensory   Sensory Quick Adds No deficits were identified   Integumentary/Edema   Integumentary/Edema no deficits were identifed   Posture   Posture forward head position;protracted shoulders;kyphosis   Range of Motion Comprehensive   General Range of Motion no range of motion deficits identified   Strength Comprehensive (MMT)   Comment, General Manual Muscle Testing (MMT) Assessment Significant global weakness observed in all major muscle groups, poor activity tolerance   Coordination   Coordination Comments Impaired   Bed Mobility   Comment (Bed Mobility) SBA   Transfers   Transfer Comments Mod assist from toilet   Balance   Balance Comments Significant impairment in standing, high fall risk   Activities of Daily Living   BADL Assessment/Intervention bathing;lower body dressing;toileting   Bathing Assessment/Intervention   Comment, (Bathing) Max assist for LE bathing   Lower Body Dressing Assessment/Training   Comment, (Lower Body Dressing) Max assist   Toileting   Comment, (Toileting) Mod-max assist   Clinical  Impression   Criteria for Skilled Therapeutic Interventions Met (OT) Yes, treatment indicated   OT Diagnosis Decline in ADL independence and safety   Influenced by the following impairments Cognition, weakness, falls   OT Problem List-Impairments impacting ADL problems related to;activity tolerance impaired;balance;cognition;coordination;mobility;strength   Assessment of Occupational Performance 5 or more Performance Deficits   Identified Performance Deficits Impaired dressing bathign and toileting independence   Planned Therapy Interventions (OT) ADL retraining;cognition;progressive activity/exercise   Clinical Decision Making Complexity (OT) low complexity   Anticipated Equipment Needs Upon Discharge (OT)   (TBD)   Risk & Benefits of therapy have been explained evaluation/treatment results reviewed;care plan/treatment goals reviewed;risks/benefits reviewed;current/potential barriers reviewed;participants voiced agreement with care plan;participants included;patient   OT Discharge Planning   OT Discharge Recommendation (DC Rec) Transitional Care Facility   OT Rationale for DC Rec Patient presents significantly below baseline with ADL independence and safety with cognitive impairment. Patient would benefit from TCU to progress ADL independence and safety. Patient would like to return home, to safely return home pt would require 24/7 assist with all mobility from a capable caregiver to prevent falls and reinjury and home health OT to return to baseline.   OT Brief overview of current status Pt demonstrated significant impulsivity and  unsteadiness during session   Total Evaluation Time (Minutes)   Total Evaluation Time (Minutes) 9   OT Goals   Therapy Frequency (OT) 5 times/wk   OT Predicted Duration/Target Date for Goal Attainment 07/08/22   OT Goals Lower Body Dressing;Toilet Transfer/Toileting;Cognition   OT: Lower Body Dressing Modified independent;using adaptive equipment   OT: Toilet Transfer/Toileting Modified  independent;toilet transfer;cleaning and garment management;using adaptive equipment   OT: Cognitive Patient/caregiver will verbalize understanding of cognitive assessment results/recommendations as needed for safe discharge planning

## 2022-07-05 NOTE — PROGRESS NOTES
"[facility]  Hospitalist Progress Note     Assessment & Plan     ASSESSMENT:    61M with hx of alcoholism, CAD s/p PCI, and HTN presents with multiple issues as described below after being found down at home. Gradually improving with IVF and supportive care although patient severely deconditioned and PT/OT recommending TCU, which patient is refusing. Will discuss further with patient's son and SW today.    PLAN:    -Found Down at Home: Suspect fall was 2/2 alcoholism and deconditioning. PT/OT rec TCU.    -Physical Deconditioning: PT/OT rec TCU. Patient is refusing. Will discuss further with patient's son and SW today.    -Acute Renal Failure w/ Rhabdomyolysis: Improved s/p IVF.    -Alcoholic Pancreatitis: Now on regular diet. Discontinue IVF today.    -Alcohol Withdrawal: CIWA.    -Alcoholism:  cessation.    -Chronic Stroke: Evident on MRI. ASA. Statin.    -Essential HTN: Home Norvasc and metop.    -CAD s/p PCI: ASA + metop + statin.    -DVT Prophy: Heparin.    -Disposition: PT/OT recommending TCU, which patient is refusing. Will discuss further with patient's son and SW today.      Juan David Carlisle MD    Subjective     Patient was seen by therapies yesterday who recommended TCU. Patient says he will refuse and wants to go home. Will discuss with son over the phone today.         Objective   Blood pressure 138/87, pulse 82, temperature 97.9  F (36.6  C), temperature source Oral, resp. rate 18, height 1.93 m (6' 4\"), weight 46.4 kg (102 lb 4.8 oz), SpO2 96 %.    PHYSICAL EXAM  General: In no acute distress  HEENT: Abrasions and contusion to right side of face and right knee.  CV: RRR.  Lungs: CTAB. Nl WOB.  Abd: Non-tender.  Ext: No edema.    LABS AND IMAGING: Reviewed and pertinent results discussed in assessment and plan.    "

## 2022-07-05 NOTE — PLAN OF CARE
Pt Ox4. Demanding of cares, uncooperative and argumentative. VSS on RA except elevated BP. Denied pain. Tele SR w/ occ PVCs, denied CP. PIV to right intact, SL. Up Ax1 GB W (intermittently sets off bed alarm and ambulates w/o assistance despite education on safety r/t falls  risk). Using urinal, occasional incontinence of bladder. Discharge plan pending. Will continue POC.     Goal Outcome Evaluation:    Plan of Care Reviewed With: patient     Overall Patient Progress: no change

## 2022-07-05 NOTE — PLAN OF CARE
"Goal Outcome Evaluation:    Plan of Care Reviewed With: patient     Overall Patient Progress: improving     Blood pressure 114/63, pulse 72, temperature 97  F (36.1  C), temperature source Oral, resp. rate 18, height 1.93 m (6' 4\"), weight 46.4 kg (102 lb 4.8 oz), SpO2 96 %.    VSS. Patient weaned off of O2.  A&Ox3 on RA.  Ax2 with GB and Walker.  Regular Diet.  PIV in Right arm running LR at 125 mL/hr.    BM. Voided within 4 hours of Thompson removal.  Swelling has lessoned around right eye. Tele: SR with Prolonged Qt and occasional PVCs.  Continue POC      "

## 2022-07-05 NOTE — PLAN OF CARE
Disoriented to time/situation. Pt uncooperative at times. A2 w/ gait/walker.  ml/hr in R PIV. RA. Tele: SR. VSS. Denies pain. CIWA: 1/1. Incontinent B/B. PT/OT consult for severe deconditioning. Slept most of the night. Continue POC.

## 2022-07-06 ENCOUNTER — APPOINTMENT (OUTPATIENT)
Dept: PHYSICAL THERAPY | Facility: CLINIC | Age: 62
End: 2022-07-06
Payer: COMMERCIAL

## 2022-07-06 LAB
AMMONIA PLAS-SCNC: 11 UMOL/L (ref 10–50)
ANION GAP SERPL CALCULATED.3IONS-SCNC: 7 MMOL/L (ref 3–14)
BUN SERPL-MCNC: 47 MG/DL (ref 7–30)
CALCIUM SERPL-MCNC: 8.8 MG/DL (ref 8.5–10.1)
CHLORIDE BLD-SCNC: 105 MMOL/L (ref 94–109)
CO2 SERPL-SCNC: 35 MMOL/L (ref 20–32)
CREAT SERPL-MCNC: 1.3 MG/DL (ref 0.66–1.25)
ERYTHROCYTE [DISTWIDTH] IN BLOOD BY AUTOMATED COUNT: 14.5 % (ref 10–15)
GFR SERPL CREATININE-BSD FRML MDRD: 62 ML/MIN/1.73M2
GLUCOSE BLD-MCNC: 157 MG/DL (ref 70–99)
HCT VFR BLD AUTO: 39.5 % (ref 40–53)
HGB BLD-MCNC: 12.8 G/DL (ref 13.3–17.7)
HOLD SPECIMEN: NORMAL
MAGNESIUM SERPL-MCNC: 1 MG/DL (ref 1.6–2.3)
MAGNESIUM SERPL-MCNC: 1.4 MG/DL (ref 1.6–2.3)
MCH RBC QN AUTO: 33.3 PG (ref 26.5–33)
MCHC RBC AUTO-ENTMCNC: 32.4 G/DL (ref 31.5–36.5)
MCV RBC AUTO: 103 FL (ref 78–100)
PHOSPHATE SERPL-MCNC: 2.4 MG/DL (ref 2.5–4.5)
PLATELET # BLD AUTO: 183 10E3/UL (ref 150–450)
POTASSIUM BLD-SCNC: 3.6 MMOL/L (ref 3.4–5.3)
RBC # BLD AUTO: 3.84 10E6/UL (ref 4.4–5.9)
SODIUM SERPL-SCNC: 147 MMOL/L (ref 133–144)
WBC # BLD AUTO: 6.8 10E3/UL (ref 4–11)

## 2022-07-06 PROCEDURE — 36415 COLL VENOUS BLD VENIPUNCTURE: CPT | Performed by: STUDENT IN AN ORGANIZED HEALTH CARE EDUCATION/TRAINING PROGRAM

## 2022-07-06 PROCEDURE — 99233 SBSQ HOSP IP/OBS HIGH 50: CPT | Performed by: STUDENT IN AN ORGANIZED HEALTH CARE EDUCATION/TRAINING PROGRAM

## 2022-07-06 PROCEDURE — 80048 BASIC METABOLIC PNL TOTAL CA: CPT | Performed by: INTERNAL MEDICINE

## 2022-07-06 PROCEDURE — 85027 COMPLETE CBC AUTOMATED: CPT | Performed by: STUDENT IN AN ORGANIZED HEALTH CARE EDUCATION/TRAINING PROGRAM

## 2022-07-06 PROCEDURE — 36415 COLL VENOUS BLD VENIPUNCTURE: CPT | Performed by: INTERNAL MEDICINE

## 2022-07-06 PROCEDURE — 250N000013 HC RX MED GY IP 250 OP 250 PS 637: Performed by: INTERNAL MEDICINE

## 2022-07-06 PROCEDURE — 83735 ASSAY OF MAGNESIUM: CPT | Performed by: STUDENT IN AN ORGANIZED HEALTH CARE EDUCATION/TRAINING PROGRAM

## 2022-07-06 PROCEDURE — 258N000003 HC RX IP 258 OP 636: Performed by: STUDENT IN AN ORGANIZED HEALTH CARE EDUCATION/TRAINING PROGRAM

## 2022-07-06 PROCEDURE — 82140 ASSAY OF AMMONIA: CPT | Performed by: STUDENT IN AN ORGANIZED HEALTH CARE EDUCATION/TRAINING PROGRAM

## 2022-07-06 PROCEDURE — 97530 THERAPEUTIC ACTIVITIES: CPT | Mod: GP | Performed by: PHYSICAL THERAPIST

## 2022-07-06 PROCEDURE — 250N000013 HC RX MED GY IP 250 OP 250 PS 637: Performed by: STUDENT IN AN ORGANIZED HEALTH CARE EDUCATION/TRAINING PROGRAM

## 2022-07-06 PROCEDURE — 120N000001 HC R&B MED SURG/OB

## 2022-07-06 PROCEDURE — 97116 GAIT TRAINING THERAPY: CPT | Mod: GP | Performed by: PHYSICAL THERAPIST

## 2022-07-06 PROCEDURE — 99207 PR NO BILLABLE SERVICE THIS VISIT: CPT | Performed by: STUDENT IN AN ORGANIZED HEALTH CARE EDUCATION/TRAINING PROGRAM

## 2022-07-06 PROCEDURE — 84100 ASSAY OF PHOSPHORUS: CPT | Performed by: STUDENT IN AN ORGANIZED HEALTH CARE EDUCATION/TRAINING PROGRAM

## 2022-07-06 RX ORDER — DEXTROSE MONOHYDRATE 50 MG/ML
INJECTION, SOLUTION INTRAVENOUS CONTINUOUS
Status: DISCONTINUED | OUTPATIENT
Start: 2022-07-06 | End: 2022-07-07

## 2022-07-06 RX ADMIN — NICOTINE 1 PATCH: 21 PATCH, EXTENDED RELEASE TRANSDERMAL at 08:59

## 2022-07-06 RX ADMIN — POTASSIUM & SODIUM PHOSPHATES POWDER PACK 280-160-250 MG 1 PACKET: 280-160-250 PACK at 11:10

## 2022-07-06 RX ADMIN — DEXTROSE MONOHYDRATE: 50 INJECTION, SOLUTION INTRAVENOUS at 11:11

## 2022-07-06 RX ADMIN — POTASSIUM & SODIUM PHOSPHATES POWDER PACK 280-160-250 MG 1 PACKET: 280-160-250 PACK at 18:53

## 2022-07-06 RX ADMIN — FOLIC ACID 1 MG: 1 TABLET ORAL at 08:59

## 2022-07-06 RX ADMIN — POTASSIUM & SODIUM PHOSPHATES POWDER PACK 280-160-250 MG 1 PACKET: 280-160-250 PACK at 14:00

## 2022-07-06 RX ADMIN — DEXTROSE MONOHYDRATE: 50 INJECTION, SOLUTION INTRAVENOUS at 21:56

## 2022-07-06 ASSESSMENT — ACTIVITIES OF DAILY LIVING (ADL)
ADLS_ACUITY_SCORE: 34
ADLS_ACUITY_SCORE: 30
ADLS_ACUITY_SCORE: 34
ADLS_ACUITY_SCORE: 30
ADLS_ACUITY_SCORE: 31
ADLS_ACUITY_SCORE: 34
ADLS_ACUITY_SCORE: 30
ADLS_ACUITY_SCORE: 34

## 2022-07-06 NOTE — PLAN OF CARE
A/Ox2. A1 w/ walker, pt refuses gait belt. Impulsive and uncooperative at times. R PIV SL. RA. Output: 200 ml. Tele: SR. CIWA: 2/2. VSS ex BP: 139/94. PT/OT recommending TCU but pt refusing. Continue POC.

## 2022-07-06 NOTE — PLAN OF CARE
"Pt disoriented to situation. Argumentative and uncooperative w/ nursing staff. VSS on RA except elevated BP . Denied pain. Tele SR, denied CP. PIV to right intact, infusing w/ D5 @ 100 ml/hr. Impulsive, sets off bed alarm and does not wait for staff assistance to bathroom as directed. Informed refusal to use GB and W. Uses urinal at bedside w/ intermittent incontinence. Spit out all AM meds but folic acid and repeatedly refused to take stating, \"no, not today.\" Phosph replaced per protocol w/ recheck 7/7 AM. Discharge TBD. Will continue POC.     Goal Outcome Evaluation:    Plan of Care Reviewed With: patient     Overall Patient Progress: no change           "

## 2022-07-06 NOTE — PROGRESS NOTES
[facility]  Hospitalist Progress Note  Magdy Perrin,  07/06/22       Reason for Stay (Diagnosis): FTT         Assessment and Plan:      Summary of Stay: Arash Corona is a 61M with hx of alcoholism, CAD s/p PCI, and HTN presents with multiple issues as described below who was admitted on 7/3/2022 after being found down at home. Gradually improving with IVF and supportive care although patient severely deconditioned and PT/OT recommending TCU, which patient is refusing.      Problem List/Assessment and Plan:   Failure to thrive  Physical deconditioning:   Found down at home.  Suspect fall was 2/2 alcoholism and deconditioning. PT/OT rec TCU, however, patient continues to think about options.  The patient is alert and oriented x4 today.  I am not yet sure that he is capable of making his own decisions as when I brought up home vs rehab he was not willing to discuss risks/benefits further.   -SW consultation  -Will discuss options with son     Acute Renal Failure w/ Rhabdomyolysis:   CK 2300 on arrival. Associated with acute renal failure. Cr 3.98 on presentation, now improved to 1.56.  Renal function has improved with IVF.   -Avoid nephrotoxins  -D5 as below for hypernatremia    Hypernatremia:  Sodium was normal on arrival.  Now has increased to 147.  Per nurse, patient is only drinking orange juice and ensures.  Free water deficit 1.5L.   -Encourage free water intake  -D5 100cc/hr x15 hours     Alcoholic Pancreatitis:   Denies abdominal pain on my examination 7/6.  -Regular diet     Alcohol use disorder  Alcohol Withdrawal:   -CIWA with prn ativan  -Thiamine & folate     Macrocytic anemia:  -Folate, B12 measurement    Hx CVA: Evident on MRI. ASA. Statin.     Essential HTN: Home Norvasc and metop.     CAD s/p PCI: ASA + metop + statin.       DVT Prophylaxis: Pneumatic Compression Devices  Code Status: Full Code  Discharge Dispo/Date: Pending improvement in phosphorus, sodium, ability to take in  "enough free water, safe dispo plan.         Interval History (Subjective):      Subjective history was gathered with the use of a professional over the phone Spanish .  Patient denies any significant symptoms apart from nausea.  There were no acute events overnight, however the patient continues to be resistant to cares.  When I brought up the idea of discharging to a rehab center, the patient reports that he is still considering this and needs to have a discussion with his family.  Towards the end of the conversation he became increasingly frustrated with me and so I did not have the option to discuss dispo with him further.    Attempted to call sonArash and no one answered.                   Physical Exam:      Last Vital Signs:  BP (!) 147/98 (BP Location: Left arm, Patient Position: Semi-Slaughter's, Cuff Size: Adult Regular)   Pulse 78   Temp 98  F (36.7  C) (Oral)   Resp 18   Ht 1.93 m (6' 4\")   Wt 46.4 kg (102 lb 4.8 oz)   SpO2 97%   BMI 12.45 kg/m        Intake/Output Summary (Last 24 hours) at 7/6/2022 1248  Last data filed at 7/6/2022 1244  Gross per 24 hour   Intake 240 ml   Output 1050 ml   Net -810 ml       General: Alert, awake, no acute distress.  Disheveled.  Alert and oriented x4.  HEENT: NC/AT, eyes anicteric, external occular movements intact, face symmetric.    Cardiac: RRR, S1, S2.  No murmurs appreciated.  Pulmonary: Normal work of breathing.  Lungs CTAB.  Abdomen: soft, non-tender, non-distended.  Bowel sounds present.  No guarding.  Extremities: no deformities.  Warm, well perfused.  Skin: no rashes or lesions noted.  Warm and dry.  Neuro: No gross deficits noted.  Speech clear.    Psych: Appropriate affect.         Medications:      All current medications were reviewed with changes reflected in problem list.         Data:      All new lab and imaging data was reviewed.   Labs:       Lab Results   Component Value Date     07/06/2022     07/05/2022     " 07/04/2022    Lab Results   Component Value Date    CHLORIDE 105 07/06/2022    CHLORIDE 102 07/05/2022    CHLORIDE 97 07/04/2022    Lab Results   Component Value Date    BUN 47 07/06/2022    BUN 79 07/05/2022     07/04/2022      Lab Results   Component Value Date    POTASSIUM 3.6 07/06/2022    POTASSIUM 3.2 07/05/2022    POTASSIUM 3.6 07/04/2022    Lab Results   Component Value Date    CO2 35 07/06/2022    CO2 35 07/05/2022    CO2 39 07/04/2022    Lab Results   Component Value Date    CR 1.30 07/06/2022    CR 1.56 07/05/2022    CR 2.11 07/04/2022        Recent Labs   Lab 07/06/22  0658   WBC 6.8   HGB 12.8*   HCT 39.5*   *         Imaging:   No results found for this or any previous visit (from the past 24 hour(s)).      Magdy Perrin,

## 2022-07-07 ENCOUNTER — APPOINTMENT (OUTPATIENT)
Dept: OCCUPATIONAL THERAPY | Facility: CLINIC | Age: 62
End: 2022-07-07
Payer: COMMERCIAL

## 2022-07-07 LAB
ANION GAP SERPL CALCULATED.3IONS-SCNC: 6 MMOL/L (ref 3–14)
BUN SERPL-MCNC: 30 MG/DL (ref 7–30)
CALCIUM SERPL-MCNC: 8.2 MG/DL (ref 8.5–10.1)
CHLORIDE BLD-SCNC: 102 MMOL/L (ref 94–109)
CO2 SERPL-SCNC: 30 MMOL/L (ref 20–32)
CREAT SERPL-MCNC: 1.01 MG/DL (ref 0.66–1.25)
ERYTHROCYTE [DISTWIDTH] IN BLOOD BY AUTOMATED COUNT: 14 % (ref 10–15)
FOLATE SERPL-MCNC: 12 NG/ML (ref 4.6–34.8)
GFR SERPL CREATININE-BSD FRML MDRD: 84 ML/MIN/1.73M2
GLUCOSE BLD-MCNC: 165 MG/DL (ref 70–99)
HBA1C MFR BLD: 5.9 % (ref 0–5.6)
HCT VFR BLD AUTO: 35.6 % (ref 40–53)
HGB BLD-MCNC: 11.9 G/DL (ref 13.3–17.7)
MAGNESIUM SERPL-MCNC: 1.1 MG/DL (ref 1.6–2.3)
MAGNESIUM SERPL-MCNC: 1.4 MG/DL (ref 1.6–2.3)
MCH RBC QN AUTO: 33.7 PG (ref 26.5–33)
MCHC RBC AUTO-ENTMCNC: 33.4 G/DL (ref 31.5–36.5)
MCV RBC AUTO: 101 FL (ref 78–100)
PHOSPHATE SERPL-MCNC: 2.3 MG/DL (ref 2.5–4.5)
PLATELET # BLD AUTO: 194 10E3/UL (ref 150–450)
POTASSIUM BLD-SCNC: 3.5 MMOL/L (ref 3.4–5.3)
RBC # BLD AUTO: 3.53 10E6/UL (ref 4.4–5.9)
SODIUM SERPL-SCNC: 138 MMOL/L (ref 133–144)
VIT B12 SERPL-MCNC: 1606 PG/ML (ref 232–1245)
WBC # BLD AUTO: 7.6 10E3/UL (ref 4–11)

## 2022-07-07 PROCEDURE — 250N000013 HC RX MED GY IP 250 OP 250 PS 637: Performed by: INTERNAL MEDICINE

## 2022-07-07 PROCEDURE — 83036 HEMOGLOBIN GLYCOSYLATED A1C: CPT | Performed by: INTERNAL MEDICINE

## 2022-07-07 PROCEDURE — 84100 ASSAY OF PHOSPHORUS: CPT | Performed by: STUDENT IN AN ORGANIZED HEALTH CARE EDUCATION/TRAINING PROGRAM

## 2022-07-07 PROCEDURE — 97535 SELF CARE MNGMENT TRAINING: CPT | Mod: GO

## 2022-07-07 PROCEDURE — 82746 ASSAY OF FOLIC ACID SERUM: CPT | Performed by: STUDENT IN AN ORGANIZED HEALTH CARE EDUCATION/TRAINING PROGRAM

## 2022-07-07 PROCEDURE — 97530 THERAPEUTIC ACTIVITIES: CPT | Mod: GO

## 2022-07-07 PROCEDURE — 258N000003 HC RX IP 258 OP 636: Performed by: STUDENT IN AN ORGANIZED HEALTH CARE EDUCATION/TRAINING PROGRAM

## 2022-07-07 PROCEDURE — 85027 COMPLETE CBC AUTOMATED: CPT | Performed by: STUDENT IN AN ORGANIZED HEALTH CARE EDUCATION/TRAINING PROGRAM

## 2022-07-07 PROCEDURE — 99232 SBSQ HOSP IP/OBS MODERATE 35: CPT | Performed by: STUDENT IN AN ORGANIZED HEALTH CARE EDUCATION/TRAINING PROGRAM

## 2022-07-07 PROCEDURE — 83735 ASSAY OF MAGNESIUM: CPT | Performed by: STUDENT IN AN ORGANIZED HEALTH CARE EDUCATION/TRAINING PROGRAM

## 2022-07-07 PROCEDURE — 82607 VITAMIN B-12: CPT | Performed by: STUDENT IN AN ORGANIZED HEALTH CARE EDUCATION/TRAINING PROGRAM

## 2022-07-07 PROCEDURE — 80048 BASIC METABOLIC PNL TOTAL CA: CPT | Performed by: STUDENT IN AN ORGANIZED HEALTH CARE EDUCATION/TRAINING PROGRAM

## 2022-07-07 PROCEDURE — 36415 COLL VENOUS BLD VENIPUNCTURE: CPT | Performed by: STUDENT IN AN ORGANIZED HEALTH CARE EDUCATION/TRAINING PROGRAM

## 2022-07-07 PROCEDURE — 250N000011 HC RX IP 250 OP 636: Performed by: STUDENT IN AN ORGANIZED HEALTH CARE EDUCATION/TRAINING PROGRAM

## 2022-07-07 PROCEDURE — 250N000013 HC RX MED GY IP 250 OP 250 PS 637: Performed by: STUDENT IN AN ORGANIZED HEALTH CARE EDUCATION/TRAINING PROGRAM

## 2022-07-07 PROCEDURE — 120N000001 HC R&B MED SURG/OB

## 2022-07-07 RX ORDER — MAGNESIUM SULFATE HEPTAHYDRATE 40 MG/ML
2 INJECTION, SOLUTION INTRAVENOUS ONCE
Status: DISCONTINUED | OUTPATIENT
Start: 2022-07-07 | End: 2022-07-08

## 2022-07-07 RX ORDER — TRAZODONE HYDROCHLORIDE 50 MG/1
50-100 TABLET, FILM COATED ORAL
Status: DISCONTINUED | OUTPATIENT
Start: 2022-07-07 | End: 2022-07-10 | Stop reason: HOSPADM

## 2022-07-07 RX ADMIN — POTASSIUM & SODIUM PHOSPHATES POWDER PACK 280-160-250 MG 1 PACKET: 280-160-250 PACK at 16:44

## 2022-07-07 RX ADMIN — THIAMINE HYDROCHLORIDE 500 MG: 100 INJECTION, SOLUTION INTRAMUSCULAR; INTRAVENOUS at 16:44

## 2022-07-07 RX ADMIN — POTASSIUM & SODIUM PHOSPHATES POWDER PACK 280-160-250 MG 1 PACKET: 280-160-250 PACK at 13:10

## 2022-07-07 RX ADMIN — THIAMINE HYDROCHLORIDE 500 MG: 100 INJECTION, SOLUTION INTRAMUSCULAR; INTRAVENOUS at 21:35

## 2022-07-07 RX ADMIN — NICOTINE 1 PATCH: 21 PATCH, EXTENDED RELEASE TRANSDERMAL at 09:16

## 2022-07-07 RX ADMIN — THIAMINE HYDROCHLORIDE 500 MG: 100 INJECTION, SOLUTION INTRAMUSCULAR; INTRAVENOUS at 10:41

## 2022-07-07 RX ADMIN — POTASSIUM & SODIUM PHOSPHATES POWDER PACK 280-160-250 MG 1 PACKET: 280-160-250 PACK at 09:10

## 2022-07-07 RX ADMIN — ASPIRIN 81 MG CHEWABLE TABLET 81 MG: 81 TABLET CHEWABLE at 09:10

## 2022-07-07 RX ADMIN — ATORVASTATIN CALCIUM 40 MG: 40 TABLET, FILM COATED ORAL at 09:10

## 2022-07-07 RX ADMIN — AMLODIPINE BESYLATE 10 MG: 10 TABLET ORAL at 09:10

## 2022-07-07 RX ADMIN — METOPROLOL SUCCINATE 25 MG: 25 TABLET, EXTENDED RELEASE ORAL at 09:10

## 2022-07-07 RX ADMIN — MULTIPLE VITAMINS W/ MINERALS TAB 1 TABLET: TAB at 09:10

## 2022-07-07 RX ADMIN — TRAZODONE HYDROCHLORIDE 50 MG: 50 TABLET ORAL at 21:31

## 2022-07-07 RX ADMIN — FOLIC ACID 1 MG: 1 TABLET ORAL at 09:10

## 2022-07-07 ASSESSMENT — ACTIVITIES OF DAILY LIVING (ADL)
ADLS_ACUITY_SCORE: 26
ADLS_ACUITY_SCORE: 30
ADLS_ACUITY_SCORE: 30
ADLS_ACUITY_SCORE: 24
ADLS_ACUITY_SCORE: 24
ADLS_ACUITY_SCORE: 30
ADLS_ACUITY_SCORE: 24
ADLS_ACUITY_SCORE: 24
ADLS_ACUITY_SCORE: 30
ADLS_ACUITY_SCORE: 24
ADLS_ACUITY_SCORE: 30
ADLS_ACUITY_SCORE: 24

## 2022-07-07 NOTE — PROGRESS NOTES
[facility]  Hospitalist Progress Note  Magdy Perrin,  07/07/22       Reason for Stay (Diagnosis): FTT         Assessment and Plan:      Summary of Stay: Arash Corona is a 61M with hx of alcoholism, CAD s/p PCI, and HTN presents with multiple issues as described below who was admitted on 7/3/2022 after being found down at home. Gradually improving with IVF and supportive care although patient severely deconditioned and PT/OT recommending TCU, which patient is refusing.      Problem List/Assessment and Plan:   Failure to thrive  Physical deconditioning  Acute toxic/metabolic encephalopathy:   Found down at home.  Suspect fall was 2/2 alcoholism and deconditioning. PT/OT rec TCU, however, patient continues to think about options.  The patient is alert and oriented x4 again today.  I am not yet sure that he is capable of making his own decisions as when I brought up home vs rehab he was not willing to discuss risks/benefits further.  Encephalopathy thought related to alcohol use, electrolyte abnormalities, alcohol withdrawal. Keli considered. Will trial high dose thiamine.   -SW consultation  -Will discuss options with son (has not been visiting or reachable by phone)  -High dose thiamine for 3 days     Acute Renal Failure w/ Rhabdomyolysis, resolved:   CK 2300 on arrival. Associated with acute renal failure. Cr 3.98 on presentation, now normalized with IVF.  -Avoid nephrotoxins    Hypernatremia, resolved:  Sodium was normal on arrival.  Increased to 147.  Per nurse, patient is only drinking orange juice and ensures.  Corrected with D5.  -Encourage free water intake     Alcoholic Pancreatitis:   Denies abdominal pain on my examination 7/6.  -Regular diet     Alcohol use disorder  Alcohol Withdrawal:   -CIWA with prn ativan  -Thiamine & folate  -Increase to high dose thiamine for 3 days or until discharge     Macrocytic anemia:  -Folate, B12 measurement    Hx CVA: Evident on MRI. ASA.  "Statin.     Essential HTN: Home Norvasc and metop.     CAD s/p PCI: ASA + metop + statin.       DVT Prophylaxis: Pneumatic Compression Devices  Code Status: Full Code  Discharge Dispo/Date: Pending improvement in electrolytes, ability to take in enough free water, safe dispo plan.         Interval History (Subjective):      Patient is doing okay.  There are no acute vents overnight.  Ongoing reports of him being forgetful, disoriented.  However, he has been oriented x4 for me.  He does tend to get irritable with multiple questions.  Occasionally refusing medications.  Informed the patient that we would have to continue to rehab with physical therapy which she was agreeable.  I am still attempting to contact with the son but have had no luck.                  Physical Exam:      Last Vital Signs:  BP (!) 141/92 (BP Location: Left arm)   Pulse 94   Temp 98.1  F (36.7  C) (Oral)   Resp 20   Ht 1.93 m (6' 4\")   Wt 46.4 kg (102 lb 4.8 oz)   SpO2 98%   BMI 12.45 kg/m        Intake/Output Summary (Last 24 hours) at 7/6/2022 1248  Last data filed at 7/6/2022 1244  Gross per 24 hour   Intake 240 ml   Output 1050 ml   Net -810 ml       General: Alert, awake, no acute distress.  Disheveled.  Alert and oriented x4.  HEENT: NC/AT, eyes anicteric, external occular movements intact, face symmetric.    Cardiac: RRR, S1, S2.  No murmurs appreciated.  Pulmonary: Normal work of breathing.  Lungs CTAB.  Abdomen: soft, non-tender, non-distended.  Bowel sounds present.  No guarding.  Extremities: no deformities.  Warm, well perfused.  Skin: no rashes or lesions noted.  Warm and dry.  Neuro: No gross deficits noted.  Speech clear.    Psych: Appropriate affect.         Medications:      All current medications were reviewed with changes reflected in problem list.         Data:      All new lab and imaging data was reviewed.   Labs:       Lab Results   Component Value Date     07/06/2022     07/05/2022     " 07/04/2022    Lab Results   Component Value Date    CHLORIDE 105 07/06/2022    CHLORIDE 102 07/05/2022    CHLORIDE 97 07/04/2022    Lab Results   Component Value Date    BUN 47 07/06/2022    BUN 79 07/05/2022     07/04/2022      Lab Results   Component Value Date    POTASSIUM 3.6 07/06/2022    POTASSIUM 3.2 07/05/2022    POTASSIUM 3.6 07/04/2022    Lab Results   Component Value Date    CO2 35 07/06/2022    CO2 35 07/05/2022    CO2 39 07/04/2022    Lab Results   Component Value Date    CR 1.30 07/06/2022    CR 1.56 07/05/2022    CR 2.11 07/04/2022        Recent Labs   Lab 07/07/22  0609   WBC 7.6   HGB 11.9*   HCT 35.6*   *         Imaging:   No results found for this or any previous visit (from the past 24 hour(s)).      Magdy Perrin, DO

## 2022-07-07 NOTE — PLAN OF CARE
Pt a/o x3, forgets why he came into hospital. VSS. Denies pain. Tele removed today. CIWA discontinued today. SBA in room, refuses gait belt and walker while in room. Walked in hallway with therapy this afternoon, used gait belt and walker in hallway. Mg and Phos low today, pt refused Mg replacement but accepted Phos replacement. Mg 1.4 and Phos 2.3. Phos redraw tomorrow morning. Pt is receiving IV thiamine. Voiding frequently, and impulsive when getting out of bed, does not call for help. Incontinent at times. BM x1 today. PRN bedtime meds ordered for tonight. Continue monitoring cognitive function.

## 2022-07-07 NOTE — PLAN OF CARE
"VSS on ra. Alert. Disoriented to situation. Pt irritable, agitated and uncooperative with staff. LS clear. Incontinent of bladder requiring linen change x 3. Tries to use urinal at times. Is very agitated about having fluids running. He believes that he is being \"leashed\". Redirection and education does not help. CIWA 4 due to agiation and confusion. RPIV infusing. Regular diet. Pt refused to allow GB or use walker.  Resistant to within arms reach. He is unsteady.   "

## 2022-07-07 NOTE — PLAN OF CARE
Goal Outcome Evaluation:    Plan of Care Reviewed With: patient     Overall Patient Progress: no change     VSS. Patient A&Ox2 on RA.  Patient disoriented to situation and time stating he does not care.  Writer reoriented patient to medications being provided IV.  Pt able to shower during shift.  CIWA scores of 1 and 1.  Patient using urinal occasionally and showered.  Assist x 1 with Walker and GB.  Regular diet.  Poor nutrition.  Tele SR with occasional PVC's.  Discharge TBD. Continue with POC.

## 2022-07-08 ENCOUNTER — APPOINTMENT (OUTPATIENT)
Dept: PHYSICAL THERAPY | Facility: CLINIC | Age: 62
End: 2022-07-08
Attending: STUDENT IN AN ORGANIZED HEALTH CARE EDUCATION/TRAINING PROGRAM
Payer: COMMERCIAL

## 2022-07-08 LAB
ANION GAP SERPL CALCULATED.3IONS-SCNC: 8 MMOL/L (ref 3–14)
BUN SERPL-MCNC: 23 MG/DL (ref 7–30)
CALCIUM SERPL-MCNC: 7.8 MG/DL (ref 8.5–10.1)
CHLORIDE BLD-SCNC: 103 MMOL/L (ref 94–109)
CO2 SERPL-SCNC: 26 MMOL/L (ref 20–32)
CREAT SERPL-MCNC: 1.1 MG/DL (ref 0.66–1.25)
GFR SERPL CREATININE-BSD FRML MDRD: 75 ML/MIN/1.73M2
GLUCOSE BLD-MCNC: 152 MG/DL (ref 70–99)
HOLD SPECIMEN: NORMAL
MAGNESIUM SERPL-MCNC: 1.1 MG/DL (ref 1.6–2.3)
MAGNESIUM SERPL-MCNC: 1.2 MG/DL (ref 1.6–2.3)
PHOSPHATE SERPL-MCNC: 3.1 MG/DL (ref 2.5–4.5)
POTASSIUM BLD-SCNC: 3.8 MMOL/L (ref 3.4–5.3)
SODIUM SERPL-SCNC: 137 MMOL/L (ref 133–144)

## 2022-07-08 PROCEDURE — 120N000001 HC R&B MED SURG/OB

## 2022-07-08 PROCEDURE — 99207 PR NO BILLABLE SERVICE THIS VISIT: CPT | Performed by: STUDENT IN AN ORGANIZED HEALTH CARE EDUCATION/TRAINING PROGRAM

## 2022-07-08 PROCEDURE — 258N000003 HC RX IP 258 OP 636: Performed by: STUDENT IN AN ORGANIZED HEALTH CARE EDUCATION/TRAINING PROGRAM

## 2022-07-08 PROCEDURE — 80048 BASIC METABOLIC PNL TOTAL CA: CPT | Performed by: STUDENT IN AN ORGANIZED HEALTH CARE EDUCATION/TRAINING PROGRAM

## 2022-07-08 PROCEDURE — 97110 THERAPEUTIC EXERCISES: CPT | Mod: GP | Performed by: PHYSICAL THERAPIST

## 2022-07-08 PROCEDURE — 250N000013 HC RX MED GY IP 250 OP 250 PS 637: Performed by: STUDENT IN AN ORGANIZED HEALTH CARE EDUCATION/TRAINING PROGRAM

## 2022-07-08 PROCEDURE — 99232 SBSQ HOSP IP/OBS MODERATE 35: CPT | Performed by: STUDENT IN AN ORGANIZED HEALTH CARE EDUCATION/TRAINING PROGRAM

## 2022-07-08 PROCEDURE — 250N000011 HC RX IP 250 OP 636: Performed by: STUDENT IN AN ORGANIZED HEALTH CARE EDUCATION/TRAINING PROGRAM

## 2022-07-08 PROCEDURE — 83735 ASSAY OF MAGNESIUM: CPT | Performed by: STUDENT IN AN ORGANIZED HEALTH CARE EDUCATION/TRAINING PROGRAM

## 2022-07-08 PROCEDURE — 36415 COLL VENOUS BLD VENIPUNCTURE: CPT | Performed by: STUDENT IN AN ORGANIZED HEALTH CARE EDUCATION/TRAINING PROGRAM

## 2022-07-08 PROCEDURE — 97116 GAIT TRAINING THERAPY: CPT | Mod: GP | Performed by: PHYSICAL THERAPIST

## 2022-07-08 PROCEDURE — 250N000013 HC RX MED GY IP 250 OP 250 PS 637: Performed by: INTERNAL MEDICINE

## 2022-07-08 PROCEDURE — 84100 ASSAY OF PHOSPHORUS: CPT | Performed by: STUDENT IN AN ORGANIZED HEALTH CARE EDUCATION/TRAINING PROGRAM

## 2022-07-08 RX ORDER — MAGNESIUM SULFATE HEPTAHYDRATE 40 MG/ML
2 INJECTION, SOLUTION INTRAVENOUS ONCE
Status: COMPLETED | OUTPATIENT
Start: 2022-07-08 | End: 2022-07-08

## 2022-07-08 RX ORDER — CARBOXYMETHYLCELLULOSE SODIUM 5 MG/ML
1 SOLUTION/ DROPS OPHTHALMIC
Status: DISCONTINUED | OUTPATIENT
Start: 2022-07-08 | End: 2022-07-09

## 2022-07-08 RX ADMIN — NICOTINE 1 PATCH: 21 PATCH, EXTENDED RELEASE TRANSDERMAL at 10:49

## 2022-07-08 RX ADMIN — ATORVASTATIN CALCIUM 40 MG: 40 TABLET, FILM COATED ORAL at 10:42

## 2022-07-08 RX ADMIN — ASPIRIN 81 MG CHEWABLE TABLET 81 MG: 81 TABLET CHEWABLE at 10:41

## 2022-07-08 RX ADMIN — TRAZODONE HYDROCHLORIDE 50 MG: 50 TABLET ORAL at 22:32

## 2022-07-08 RX ADMIN — AMLODIPINE BESYLATE 10 MG: 10 TABLET ORAL at 10:41

## 2022-07-08 RX ADMIN — MAGNESIUM SULFATE HEPTAHYDRATE 2 G: 40 INJECTION, SOLUTION INTRAVENOUS at 20:12

## 2022-07-08 RX ADMIN — Medication 1 DROP: at 14:45

## 2022-07-08 RX ADMIN — MAGNESIUM SULFATE HEPTAHYDRATE 2 G: 40 INJECTION, SOLUTION INTRAVENOUS at 12:33

## 2022-07-08 RX ADMIN — THIAMINE HYDROCHLORIDE 500 MG: 100 INJECTION, SOLUTION INTRAMUSCULAR; INTRAVENOUS at 22:35

## 2022-07-08 RX ADMIN — THIAMINE HYDROCHLORIDE 500 MG: 100 INJECTION, SOLUTION INTRAMUSCULAR; INTRAVENOUS at 17:52

## 2022-07-08 RX ADMIN — THIAMINE HYDROCHLORIDE 500 MG: 100 INJECTION, SOLUTION INTRAMUSCULAR; INTRAVENOUS at 10:50

## 2022-07-08 RX ADMIN — Medication 1 DROP: at 17:00

## 2022-07-08 RX ADMIN — METOPROLOL SUCCINATE 25 MG: 25 TABLET, EXTENDED RELEASE ORAL at 10:42

## 2022-07-08 ASSESSMENT — ACTIVITIES OF DAILY LIVING (ADL)
ADLS_ACUITY_SCORE: 24

## 2022-07-08 NOTE — PROGRESS NOTES
Care Management Follow Up    Length of Stay (days): 5    Expected Discharge Date: 07/08/2022     Concerns to be Addressed:       Patient plan of care discussed at interdisciplinary rounds: Yes    Anticipated Discharge Disposition:       Anticipated Discharge Services:    Anticipated Discharge DME:      Patient/family educated on Medicare website which has current facility and service quality ratings:    Education Provided on the Discharge Plan:    Patient/Family in Agreement with the Plan:      Referrals Placed by CM/SW:    Private pay costs discussed: Not applicable    Additional Information:  SW met with pt. Pt appears more alert. Pt was appropriate and engaged with writer. Pt declines TCU and interested in home care for PT.     [facility]- declined   [facility] [phone] Fax: [phone]-Admission is gone, referral faxed  [facility] [phone] - Can take [employer] PMAP, but do not have PT in the area.  [facility]- Not accept PMAP   [facility]- Not accept PMAP  [facility]  Fax [phone] - Agreeable to review. Referral sent          DARRIAN Watson

## 2022-07-08 NOTE — PROGRESS NOTES
"SPIRITUAL HEALTH SERVICES Progress Note  RH  MS 3    Saw pt Arash Crockettinets per length of stay in the hospital. Arash is Zoroastrian.     Oriented to [facility]. Pt declined a visit at this time, and shared that he felt spiritually and emotionally supported by his wife.  \"My wife is Zoroastrian, all my friends are Christians.\"    [facility] remains available per pt/family request.     JOSE Arevalo  Intern   Phone: [phone]   "

## 2022-07-08 NOTE — PLAN OF CARE
VSS on ra. Alert. Disoriented to situation vs being annoyed with questions. Did state he doesn't know why he is here. Pt less irritable, agitated. Still uncooperative at times. LS clear. No incontinence tonight.  RPIV SL. Regular diet. Pt continues to refuse to use GB or walker.  Resistant to within arms reach. He is unsteady. Education and encouragement makes pt much more agitated.

## 2022-07-08 NOTE — PLAN OF CARE
"Goal Outcome Evaluation:    Plan of Care Reviewed With: patient     Overall Patient Progress: improving    Blood pressure (!) 134/91, pulse 91, temperature 98.2  F (36.8  C), temperature source Oral, resp. rate 20, height 1.93 m (6' 4\"), weight 46.4 kg (102 lb 4.8 oz), SpO2 95 %.    VSS other than BP.  Patient A&Ox3 on RA.  Patient continues to be impulsive and is unsteady in gait.  Refuses to have magnesium via IV.  Provider notified of patient desire to have meds he was prescribed for sleep.  Trazdone provided.  PIV SL on right forearm.  Regular diet.  Good intake.  Continue with POC.     "

## 2022-07-08 NOTE — PROGRESS NOTES
[facility]  Hospitalist Progress Note  Magdy Perrin,  07/08/22       Reason for Stay (Diagnosis): FTT         Assessment and Plan:      Summary of Stay: Arash Corona is a 61M with hx of alcoholism, CAD s/p PCI, and HTN presents with multiple issues as described below who was admitted on 7/3/2022 after being found down at home. Gradually improving with IVF and supportive care although patient severely deconditioned and PT/OT recommending TCU, which patient is refusing.      Problem List/Assessment and Plan:   Failure to thrive  Physical deconditioning  Acute toxic/metabolic encephalopathy:   Found down at home.  Suspect fall was 2/2 alcoholism and deconditioning. PT/OT rec TCU, however, patient continues to think about options.  Encephalopathy thought related to alcohol use, electrolyte abnormalities, uremia, alcohol withdrawal. Keli considered an therefore he is receiving high dose thiamine. He is very clear today and able to provide me with full history.   -SW consultation  -Will discuss options with son (has not been visiting or reachable by phone)  -High dose thiamine for 3 days  -PT/OT following, awaiting recommendations  -Alcohol cessation encouraged     Acute Renal Failure w/ Rhabdomyolysis, resolved:   CK 2300 on arrival. Associated with acute renal failure. Cr 3.98 on presentation, now normalized with IVF.  -Avoid nephrotoxins    Hypernatremia, resolved:  Sodium was normal on arrival.  Increased to 147.  Per nurse, patient is only drinking orange juice and ensures.  Corrected with D5.  -Encourage free water intake     Alcoholic Pancreatitis:   Denies abdominal pain on my examination 7/6.  -Regular diet     Alcohol use disorder  Alcohol Withdrawal:   -CIWA with prn ativan  -Thiamine & folate  -High dose thiamine for 3 days or until discharge if leaves before     Macrocytic anemia:  -Folate, B12 measurement    Hx CVA: Evident on MRI. ASA. Statin.     Essential HTN: Home Norvasc and  "metop.     CAD s/p PCI: ASA + metop + statin.       DVT Prophylaxis: Pneumatic Compression Devices  Code Status: Full Code  Discharge Dispo/Date: Pending improvement in electrolytes, safe dispo plan. Anticipate discharge over the weekend if cleared by PT/OT.        Interval History (Subjective):      Patient is feeling okay today.  His only complaint is dry eyes on the right side.  He also continues to be frustrated with need to be on IV infusions.  He understands these are for magnesium replacement and thiamine infusion.  He reports to me that he is against taking multivitamins.  When asked why, he reports that he was previously prescribed a multivitamin that left bad taste in his mouth for the entire day.  Says he will never take a [employer] multivitamin ever again.  States he is also tried syndrome which also led to bad taste in the mouth.  He reports that he likes the blueberry and cherry versions.     We discussed alcohol cessation.  His ex-wife was very honest with me with how severe his alcohol use disorder is.  He became very frustrated with her.  They asked questions about how he could maintain normal electrolytes and normal vitamin levels in the bloodstream to which I reported alcohol cessation and normal diet.           Physical Exam:      Last Vital Signs:  /86   Pulse 94   Temp 98.7  F (37.1  C) (Oral)   Resp 20   Ht 1.93 m (6' 4\")   Wt 46.4 kg (102 lb 4.8 oz)   SpO2 94%   BMI 12.45 kg/m        Intake/Output Summary (Last 24 hours) at 7/6/2022 1248  Last data filed at 7/6/2022 1244  Gross per 24 hour   Intake 240 ml   Output 1050 ml   Net -810 ml       General: Alert, awake, no acute distress.  Disheveled.  Alert and oriented x4.  Pleasant today.  HEENT: NC/AT, eyes anicteric, external occular movements intact, face symmetric.    Cardiac: RRR, S1, S2.  No murmurs appreciated.  Pulmonary: Normal work of breathing.  Lungs CTAB.  Abdomen: soft, non-tender, non-distended.  Bowel sounds present.  " No guarding.  Extremities: no deformities.  Warm, well perfused.  Skin: no rashes or lesions noted.  Warm and dry.  Neuro: No gross deficits noted.  Speech clear.    Psych: Appropriate affect.         Medications:      All current medications were reviewed with changes reflected in problem list.         Data:      All new lab and imaging data was reviewed.   Labs:       Lab Results   Component Value Date     07/06/2022     07/05/2022     07/04/2022    Lab Results   Component Value Date    CHLORIDE 105 07/06/2022    CHLORIDE 102 07/05/2022    CHLORIDE 97 07/04/2022    Lab Results   Component Value Date    BUN 47 07/06/2022    BUN 79 07/05/2022     07/04/2022      Lab Results   Component Value Date    POTASSIUM 3.6 07/06/2022    POTASSIUM 3.2 07/05/2022    POTASSIUM 3.6 07/04/2022    Lab Results   Component Value Date    CO2 35 07/06/2022    CO2 35 07/05/2022    CO2 39 07/04/2022    Lab Results   Component Value Date    CR 1.30 07/06/2022    CR 1.56 07/05/2022    CR 2.11 07/04/2022        Recent Labs   Lab 07/07/22  0609   WBC 7.6   HGB 11.9*   HCT 35.6*   *         Imaging:   No results found for this or any previous visit (from the past 24 hour(s)).      Magdy Perrin DO

## 2022-07-08 NOTE — PROGRESS NOTES
I was called because patient does not want to take Seroquel.  He is requesting clonazepam to help him sleep tonight.  I put Seroquel on hold and ordered trazodone as needed for sleep.  I am not sure benzodiazepine is the best for him.  I will defer to rounder tomorrow.

## 2022-07-08 NOTE — PLAN OF CARE
VSS on RA, A/OX4 but forgetful, pt has been more cooperative today, denies pain, LS clear, mg-1.1 replaced via IV, recheck at 1700 and tomorrow AM, pt c/o right eye pain and drain, sclera is white and drainage is clear, MD aware and assessed eye, eye drops ordered prn, discharge possible in 1-2 days.

## 2022-07-09 ENCOUNTER — APPOINTMENT (OUTPATIENT)
Dept: PHYSICAL THERAPY | Facility: CLINIC | Age: 62
End: 2022-07-09
Payer: COMMERCIAL

## 2022-07-09 ENCOUNTER — APPOINTMENT (OUTPATIENT)
Dept: OCCUPATIONAL THERAPY | Facility: CLINIC | Age: 62
End: 2022-07-09
Payer: COMMERCIAL

## 2022-07-09 LAB
ANION GAP SERPL CALCULATED.3IONS-SCNC: 7 MMOL/L (ref 3–14)
BUN SERPL-MCNC: 20 MG/DL (ref 7–30)
CALCIUM SERPL-MCNC: 8.3 MG/DL (ref 8.5–10.1)
CHLORIDE BLD-SCNC: 105 MMOL/L (ref 94–109)
CO2 SERPL-SCNC: 27 MMOL/L (ref 20–32)
CREAT SERPL-MCNC: 1.09 MG/DL (ref 0.66–1.25)
GFR SERPL CREATININE-BSD FRML MDRD: 76 ML/MIN/1.73M2
GLUCOSE BLD-MCNC: 130 MG/DL (ref 70–99)
MAGNESIUM SERPL-MCNC: 1.8 MG/DL (ref 1.6–2.3)
MAGNESIUM SERPL-MCNC: 2 MG/DL (ref 1.6–2.3)
PHOSPHATE SERPL-MCNC: 2.9 MG/DL (ref 2.5–4.5)
POTASSIUM BLD-SCNC: 3.8 MMOL/L (ref 3.4–5.3)
SODIUM SERPL-SCNC: 139 MMOL/L (ref 133–144)

## 2022-07-09 PROCEDURE — 97535 SELF CARE MNGMENT TRAINING: CPT | Mod: GO | Performed by: OCCUPATIONAL THERAPIST

## 2022-07-09 PROCEDURE — 120N000001 HC R&B MED SURG/OB

## 2022-07-09 PROCEDURE — 250N000013 HC RX MED GY IP 250 OP 250 PS 637: Performed by: STUDENT IN AN ORGANIZED HEALTH CARE EDUCATION/TRAINING PROGRAM

## 2022-07-09 PROCEDURE — 36415 COLL VENOUS BLD VENIPUNCTURE: CPT | Performed by: STUDENT IN AN ORGANIZED HEALTH CARE EDUCATION/TRAINING PROGRAM

## 2022-07-09 PROCEDURE — 83735 ASSAY OF MAGNESIUM: CPT | Performed by: STUDENT IN AN ORGANIZED HEALTH CARE EDUCATION/TRAINING PROGRAM

## 2022-07-09 PROCEDURE — 97530 THERAPEUTIC ACTIVITIES: CPT | Mod: GP | Performed by: PHYSICAL THERAPIST

## 2022-07-09 PROCEDURE — 250N000013 HC RX MED GY IP 250 OP 250 PS 637: Performed by: INTERNAL MEDICINE

## 2022-07-09 PROCEDURE — 84100 ASSAY OF PHOSPHORUS: CPT | Performed by: STUDENT IN AN ORGANIZED HEALTH CARE EDUCATION/TRAINING PROGRAM

## 2022-07-09 PROCEDURE — 80048 BASIC METABOLIC PNL TOTAL CA: CPT | Performed by: STUDENT IN AN ORGANIZED HEALTH CARE EDUCATION/TRAINING PROGRAM

## 2022-07-09 PROCEDURE — 99232 SBSQ HOSP IP/OBS MODERATE 35: CPT | Performed by: STUDENT IN AN ORGANIZED HEALTH CARE EDUCATION/TRAINING PROGRAM

## 2022-07-09 RX ORDER — CARBOXYMETHYLCELLULOSE SODIUM 5 MG/ML
1 SOLUTION/ DROPS OPHTHALMIC 3 TIMES DAILY PRN
Status: DISCONTINUED | OUTPATIENT
Start: 2022-07-09 | End: 2022-07-10 | Stop reason: HOSPADM

## 2022-07-09 RX ADMIN — NICOTINE 1 PATCH: 21 PATCH, EXTENDED RELEASE TRANSDERMAL at 08:46

## 2022-07-09 RX ADMIN — Medication 1 DROP: at 15:20

## 2022-07-09 RX ADMIN — ASPIRIN 81 MG CHEWABLE TABLET 81 MG: 81 TABLET CHEWABLE at 08:45

## 2022-07-09 RX ADMIN — Medication 1 DROP: at 06:35

## 2022-07-09 RX ADMIN — TRAZODONE HYDROCHLORIDE 50 MG: 50 TABLET ORAL at 22:08

## 2022-07-09 RX ADMIN — ATORVASTATIN CALCIUM 40 MG: 40 TABLET, FILM COATED ORAL at 08:45

## 2022-07-09 RX ADMIN — Medication 1 DROP: at 21:34

## 2022-07-09 RX ADMIN — Medication 1 DROP: at 08:43

## 2022-07-09 RX ADMIN — METOPROLOL SUCCINATE 25 MG: 25 TABLET, EXTENDED RELEASE ORAL at 08:45

## 2022-07-09 RX ADMIN — AMLODIPINE BESYLATE 10 MG: 10 TABLET ORAL at 08:45

## 2022-07-09 ASSESSMENT — ACTIVITIES OF DAILY LIVING (ADL)
ADLS_ACUITY_SCORE: 24

## 2022-07-09 ASSESSMENT — MIFFLIN-ST. JEOR: SCORE: 1634.25

## 2022-07-09 NOTE — PLAN OF CARE
Goal Outcome Evaluation:     Pt to consume >/=75% of meals ordered TID. Declined all oral nutritional supplements at this time. Encouraged protein with meals as able and food from home. Dislikes many of the food options available.     Bhavana Conklin RD, LD  Clinical Dietitian     3rd floor/ICU: [phone]  All other floors: [phone]  Weekend/holiday: [phone]  Office: [phone]

## 2022-07-09 NOTE — PLAN OF CARE
VSS on RA, A/OX4, denies pain, LS clear, prn eye drops given twice today, OT and PT evaluated pt today, discharge timeline TBD.

## 2022-07-09 NOTE — PROGRESS NOTES
[facility]  Hospitalist Progress Note  Magdy Perrin,  07/09/22       Reason for Stay (Diagnosis): FTT         Assessment and Plan:      Summary of Stay: Arash Corona is a 61M with hx of alcoholism, CAD s/p PCI, and HTN presents with multiple issues as described below who was admitted on 7/3/2022 after being found down at home. Gradually improving with IVF and supportive care although patient severely deconditioned and PT/OT recommending TCU, which patient is refusing.      Problem List/Assessment and Plan:   Failure to thrive  Physical deconditioning  Acute toxic/metabolic encephalopathy:   Found down at home.  Suspect fall was 2/2 alcoholism and deconditioning. PT/OT rec TCU, however, patient continues to think about options.  Encephalopathy thought related to alcohol use, electrolyte abnormalities, uremia, alcohol withdrawal. Keli considered and therefore he received high dose thiamine with significant improvement.  He is now appropriate, clear, decisional and able to provide me with full history.  Son reports concerns about mentation and therefore has been seeing neurologist as outpatient. Brain MRI 1-2 months ago unremarkable apart from age related changes.  I do think that mentation is close to normal and would only continue to improve with alcohol cessation.    -SW consultation  -PT/OT following, awaiting recommendations  -Alcohol cessation encouraged     Acute Renal Failure w/ Rhabdomyolysis, resolved:   CK 2300 on arrival. Associated with acute renal failure. Cr 3.98 on presentation, now normalized with IVF.  -Avoid nephrotoxins    Hypernatremia, resolved:  Sodium was normal on arrival.  Increased to 147.  Per nurse, patient is only drinking orange juice and ensures.  Corrected with D5.  -Encourage free water intake     Alcoholic Pancreatitis:   Denies abdominal pain on my examination 7/6.  -Regular diet     Alcohol use disorder  Alcohol Withdrawal:   -CIWA with prn  "ativan  -Thiamine & folate     Macrocytic anemia:  -Folate, B12 measurement    Hx CVA: Evident on MRI. ASA. Statin.     Essential HTN: Home Norvasc and metop.     CAD s/p PCI: ASA + metop + statin.       DVT Prophylaxis: Pneumatic Compression Devices  Code Status: Full Code  Discharge Dispo/Date: Pending improvement in electrolytes, safe dispo plan. Anticipate discharge over the weekend if cleared by PT/OT.        Interval History (Subjective):      Patient is feeling fine.  He has no acute complaints.  He reports that he does need to see his salad which is restaurant quality per his report.  He is very pleased with this.  He reports that he is been getting up and walking around fairly easily.  He notes that his son was just here.  We discussed the idea of discharging home potentially in the next 1 to 2 days.  He reports that Monday would be the earliest possible day as his son currently has his keys to his apartment and he has no way of getting home.  I discussed the case over the phone with the son.  The son is going to work on trying to find out who is the fierro.           Physical Exam:      Last Vital Signs:  /85 (BP Location: Left arm)   Pulse 79   Temp 98.2  F (36.8  C) (Oral)   Resp 20   Ht 1.93 m (6' 4\")   Wt 46.4 kg (102 lb 4.8 oz)   SpO2 93%   BMI 12.45 kg/m        Intake/Output Summary (Last 24 hours) at 7/6/2022 1248  Last data filed at 7/6/2022 1244  Gross per 24 hour   Intake 240 ml   Output 1050 ml   Net -810 ml       General: Alert, awake, no acute distress.  Alert and oriented x4.  Pleasant.  HEENT: NC/AT, eyes anicteric, external occular movements intact, face symmetric.    Cardiac: RRR, S1, S2.  No murmurs appreciated.  Pulmonary: Normal work of breathing.  Lungs CTAB.  Abdomen: soft, non-tender, non-distended.  Bowel sounds present.  No guarding.  Extremities: no deformities.  Warm, well perfused.  Skin: no rashes or lesions noted.  Warm and dry.  Neuro: No gross deficits noted.  " Speech clear.    Psych: Appropriate affect.         Medications:      All current medications were reviewed with changes reflected in problem list.         Data:      All new lab and imaging data was reviewed.   Labs:       Lab Results   Component Value Date     07/06/2022     07/05/2022     07/04/2022    Lab Results   Component Value Date    CHLORIDE 105 07/06/2022    CHLORIDE 102 07/05/2022    CHLORIDE 97 07/04/2022    Lab Results   Component Value Date    BUN 47 07/06/2022    BUN 79 07/05/2022     07/04/2022      Lab Results   Component Value Date    POTASSIUM 3.6 07/06/2022    POTASSIUM 3.2 07/05/2022    POTASSIUM 3.6 07/04/2022    Lab Results   Component Value Date    CO2 35 07/06/2022    CO2 35 07/05/2022    CO2 39 07/04/2022    Lab Results   Component Value Date    CR 1.30 07/06/2022    CR 1.56 07/05/2022    CR 2.11 07/04/2022        Recent Labs   Lab 07/07/22  0609   WBC 7.6   HGB 11.9*   HCT 35.6*   *         Imaging:   No results found for this or any previous visit (from the past 24 hour(s)).      Magdy Perrin DO

## 2022-07-09 NOTE — PLAN OF CARE
Pt A&Ox4 but forgetful. VSS. Denied pain. Up A-1 w/ walker. No drainage noted to right eye, slightly pink. Drops given per pt request. Voiding adequately. Nicotine patch in place to left shoulder. Mag 1.8 no replacement. Discharge pending. Will cont to POC.

## 2022-07-09 NOTE — PLAN OF CARE
Goal Outcome Evaluation:   A/O x 4, but forgetful. VSS on RA. Had IV Magnesium replacement. C/o right eye pain and PRN eye drops given per MAR; pt stated eye drops helped him. Up with 1 assist;  refuses gaitbelt.

## 2022-07-09 NOTE — PROGRESS NOTES
"CLINICAL NUTRITION SERVICES - ASSESSMENT NOTE     Nutrition Prescription    Recommendations:  Continue diet as ordered --> declined all oral nutritional supplements , encouraged protein with meals as able and food from home    MALNUTRITION:  % Weight Loss:  > 7.5% in 3 months (severe malnutrition) - 16% in ~ 2 months?   % Intake:  <75% for >/= 1 month (moderate malnutrition)  Subcutaneous Fat Loss:  Orbital region moderate depletion and Upper arm region moderate depletion   Muscle Loss:  Temporal region moderate depletion, Clavicle bone region moderate depletion, Acromion bone region moderate depletion, Scapular bone region moderate depletion, Anterior thigh region mild-moderate depletion and Posterior calf region mild-moderate depletion  Fluid Retention: none documented     Malnutrition Diagnosis: Severe malnutrition  In Context of:  Acute illness or injury  Environmental or social circumstances       REASON FOR ASSESSMENT  Arash Corona is a/an 61 year old male assessed by the dietitian for Reason For Assessment: LOS    See \"Adult Nutrition Assessment\" flowsheet for additional details.    - Information obtained from chart and patient   - Diet at home: regular diet   - Meal/Snack Patterns: pt reports a good appetite PTA, no changes to PO intake. Typically consumes 4 meals per day per report. However pt with significant weight loss over the past 2 months per weight history below, suspect pt consuming <75% of nutritional needs for the past >2 months   - Supplemental Drinks/Foods/Additives: protein shake (21 g protein)  -  Food Allergy(s)/Intolerance(s): pumpkin - added to allergy list    - Weight history:  Wt Readings from Last 10 Encounters:   07/09/22 73.8 kg (162 lb 9.6 oz)   04/04/22 88.2 kg (194 lb 6.4 oz)   03/10/22 89.8 kg (198 lb)   02/24/22 86.2 kg (190 lb)   12/31/20 84.4 kg (186 lb)   03/27/19 90.6 kg (199 lb 11.2 oz)     - weight loss of 14.4 kg (16%) over the past ~ 2 months? Pt notes a weight loss " over the past 1 year.     CURRENT NUTRITION ORDERS  Diet/Nutrition Received: regular (pt notes that he dislikes the food options available, having food brought in from home.)    Current Intake/Tolerance:  % intakes based on flowsheet review. Pt notes that he dislikes the food options available, having food brought in from home.    ASSESSED NUTRITION NEEDS:  Weight for Calculation (kg): 73.8 kg (162 lb 11.2 oz)    Estimated Energy Needs: [place]-St[place] Jeor (Considerations): 1961 kcal (AF 1.2)  Justification: maintenance  Estimated Protein Needs:  grams protein (1.2-1.5 g pro/Kg)  Justification: preservation of lean body mass  Estimated Fluid Needs: per MD    Nutrition Diagnosis: inadequate oral intake related to suspected poor nutritional choices with consumption of EtOH as evidenced by pt likely consuming <75% of nutritional needs based on nutrition focused physical exam      INTERVENTIONS  Recommendations  See above    Implementation  Multivitamin/Mineral: as ordered      Goals  Nutrition Goals: pt to consume >/=75% of meals ordered TID     Monitoring/Evaluation  Progress towards goals will be monitored and evaluated per protocol and Practice Guidelines    Bhavana Conklin RD, LD  Clinical Dietitian     3rd floor/ICU: [phone]  All other floors: [phone]  Weekend/holiday: [phone]  Office: [phone]

## 2022-07-10 VITALS
HEIGHT: 76 IN | BODY MASS INDEX: 19.8 KG/M2 | TEMPERATURE: 98.1 F | WEIGHT: 162.6 LBS | OXYGEN SATURATION: 97 % | SYSTOLIC BLOOD PRESSURE: 124 MMHG | RESPIRATION RATE: 20 BRPM | HEART RATE: 86 BPM | DIASTOLIC BLOOD PRESSURE: 89 MMHG

## 2022-07-10 LAB
MAGNESIUM SERPL-MCNC: 1.4 MG/DL (ref 1.6–2.3)
PHOSPHATE SERPL-MCNC: 3 MG/DL (ref 2.5–4.5)
SARS-COV-2 RNA RESP QL NAA+PROBE: NEGATIVE

## 2022-07-10 PROCEDURE — 99239 HOSP IP/OBS DSCHRG MGMT >30: CPT | Performed by: STUDENT IN AN ORGANIZED HEALTH CARE EDUCATION/TRAINING PROGRAM

## 2022-07-10 PROCEDURE — 36415 COLL VENOUS BLD VENIPUNCTURE: CPT | Performed by: STUDENT IN AN ORGANIZED HEALTH CARE EDUCATION/TRAINING PROGRAM

## 2022-07-10 PROCEDURE — 83735 ASSAY OF MAGNESIUM: CPT | Performed by: STUDENT IN AN ORGANIZED HEALTH CARE EDUCATION/TRAINING PROGRAM

## 2022-07-10 PROCEDURE — 84100 ASSAY OF PHOSPHORUS: CPT | Performed by: STUDENT IN AN ORGANIZED HEALTH CARE EDUCATION/TRAINING PROGRAM

## 2022-07-10 PROCEDURE — 99207 PR NO BILLABLE SERVICE THIS VISIT: CPT | Performed by: STUDENT IN AN ORGANIZED HEALTH CARE EDUCATION/TRAINING PROGRAM

## 2022-07-10 PROCEDURE — U0005 INFEC AGEN DETEC AMPLI PROBE: HCPCS | Performed by: STUDENT IN AN ORGANIZED HEALTH CARE EDUCATION/TRAINING PROGRAM

## 2022-07-10 PROCEDURE — 250N000011 HC RX IP 250 OP 636: Performed by: STUDENT IN AN ORGANIZED HEALTH CARE EDUCATION/TRAINING PROGRAM

## 2022-07-10 PROCEDURE — 250N000013 HC RX MED GY IP 250 OP 250 PS 637: Performed by: INTERNAL MEDICINE

## 2022-07-10 RX ORDER — LANOLIN ALCOHOL/MO/W.PET/CERES
100 CREAM (GRAM) TOPICAL DAILY
Qty: 30 TABLET | Refills: 0 | Status: SHIPPED | OUTPATIENT
Start: 2022-07-10

## 2022-07-10 RX ORDER — NICOTINE 21 MG/24HR
1 PATCH, TRANSDERMAL 24 HOURS TRANSDERMAL DAILY
Qty: 14 PATCH | Refills: 0 | Status: SHIPPED | OUTPATIENT
Start: 2022-07-11

## 2022-07-10 RX ORDER — MAGNESIUM SULFATE HEPTAHYDRATE 40 MG/ML
4 INJECTION, SOLUTION INTRAVENOUS ONCE
Status: COMPLETED | OUTPATIENT
Start: 2022-07-10 | End: 2022-07-10

## 2022-07-10 RX ORDER — LANOLIN ALCOHOL/MO/W.PET/CERES
400 CREAM (GRAM) TOPICAL DAILY
Qty: 30 TABLET | Refills: 0 | Status: SHIPPED | OUTPATIENT
Start: 2022-07-10

## 2022-07-10 RX ADMIN — ASPIRIN 81 MG CHEWABLE TABLET 81 MG: 81 TABLET CHEWABLE at 09:43

## 2022-07-10 RX ADMIN — METOPROLOL SUCCINATE 25 MG: 25 TABLET, EXTENDED RELEASE ORAL at 09:43

## 2022-07-10 RX ADMIN — AMLODIPINE BESYLATE 10 MG: 10 TABLET ORAL at 09:43

## 2022-07-10 RX ADMIN — NICOTINE 1 PATCH: 21 PATCH, EXTENDED RELEASE TRANSDERMAL at 09:50

## 2022-07-10 RX ADMIN — ATORVASTATIN CALCIUM 40 MG: 40 TABLET, FILM COATED ORAL at 09:43

## 2022-07-10 RX ADMIN — MAGNESIUM SULFATE HEPTAHYDRATE 4 G: 40 INJECTION, SOLUTION INTRAVENOUS at 09:44

## 2022-07-10 ASSESSMENT — ACTIVITIES OF DAILY LIVING (ADL)
ADLS_ACUITY_SCORE: 24

## 2022-07-10 NOTE — PROGRESS NOTES
Care Management Follow Up    Length of Stay (days): 7    Expected Discharge Date: 07/10/2022     Concerns to be Addressed:     Home PT/OT    Additional Information:  Spoke with [facility], they received referral on 7/8, the weekend staff stated someone would call in the morning to see if they accepted, orders were faxed.  Also left message for [facility] and faxed referral and sent email, [facility] was also faxed referral on 7/8 and CLARY.    ESVIN Abdullahi

## 2022-07-10 NOTE — PLAN OF CARE
Goal Outcome Evaluation:     A/O x 4, forgetful at times. VSS on RA. C/o R eye discomfort and had PRN eye drop R eye. PRN trazodone given. Up with SBA.

## 2022-07-10 NOTE — PLAN OF CARE
VSS on RA, A/OX4, LS clear, denies pain, mg-1.4, replaced via IV, MD aware and states pt can discharge without a mg level recheck, reviewed discharge paperwork w/ pt, pt verbalized understanding, discharge meds given to pt, pt will discharge off the unit via wheelchair, home via daughters personal vehicle.

## 2022-07-10 NOTE — PROGRESS NOTES
"Occupational Therapy Discharge Summary    Reason for therapy discharge:    Discharged to home with home therapy.    Progress towards therapy goal(s). See goals on Care Plan in [facility] electronic health record for goal details.  Goals partially met.  Barriers to achieving goals:   discharge from facility.    Therapy recommendation(s):    Continued therapy is recommended.  Rationale/Recommendations:  \"Pt appears to be improving but still somewhat below his baseline. Declining TCU, is interested in home therapies. Would benefit from home OT to check on any adaptations needed and safety for ADLS and IADLS with walker.\".     Written based on treating therapist recs. Recommendation made by Bartolo Kwok OTR on 7/9/22      "

## 2022-07-10 NOTE — PLAN OF CARE
Pt A&Ox4 but forgetful. VSS. Denied pain. SBA.  Voiding adequately. Nicotine patch in place to right shoulder. Rested comfortably throughout the night.Possible discharge today. Will cont to POC.

## 2022-07-10 NOTE — DISCHARGE SUMMARY
[facility]  Discharge Summary  Name: Arash Corona    MRN: [id number]  YOB: 1960    Age: 61 year old  Date of Discharge:  7/10/2022  1:50 PM  Date of Admission: 7/2/2022  Primary Care Provider: Reese Kern  Discharge Physician:  Magdy Perrin DO  Discharging Service:  Hospitalist      Hospital Course  Summary of Stay: Arash Corona is a 61M with hx of alcoholism, CAD s/p PCI, and HTN presents with multiple issues as described below who was admitted on 7/3/2022 after being found down at home.     He was found to have an acute kidney injury with uremia, electrolyte abnormalities.  Fortunately his mentation slowly improved with IV fluid, electrolyte replacement, thiamine and folate replacement, and improvement in his kidney function.  With this, his strength also improved.  Really on TCU was recommended however by the day he discharged home with assist was recommended.  The patient was discharged home with plan to be assisted by his kids as well as his ex-spouse.     Discharge Diagnoses:  Failure to thrive  Physical deconditioning  Acute toxic/metabolic encephalopathy:   Found down at home.  Suspect fall was 2/2 alcoholism and deconditioning.  Encephalopathy thought related to alcohol use, electrolyte abnormalities, uremia, alcohol withdrawal.  Keli considered and therefore he received high dose thiamine with significant improvement.  He is now appropriate, clear, decisional and able to provide me with full history.  Son reports concerns about mentation and therefore has been seeing neurologist as outpatient. Brain MRI 1-2 months ago unremarkable apart from age related changes.  I do think that mentation is close to normal and would only continue to improve with alcohol cessation.  His son is going to help him schedule a follow-up appointment with neurology.  He was discharged home with 24-hour assist to be provided by his kids, ex-spouse.  He was discharged with  thiamine, folate.  He reported that there is no alcohol in his house and he plans to abstain.     Acute Renal Failure w/ Rhabdomyolysis, resolved:   CK 2300 on arrival. Associated with acute renal failure. Cr 3.98 on presentation, now normalized with IVF.     Hypernatremia, resolved:  Sodium was normal on arrival.  Increased to 147.  Per nurse, patient is only drinking orange juice and ensures.  Corrected with D5.    Hypokalemia  Hypomagnesemia  Hypophosphatemia:  All likely related to poor oral intake, alcohol use.  He is replaced as needed while inpatient.     Alcoholic Pancreatitis:   Tolerating regular diet on the day of discharge.      Alcohol use disorder  Alcohol Withdrawal:   Monitored with CIWA while inpatient.  Also received high-dose thiamine, folate as above.     Macrocytic anemia: Folate as above.  Recommended MVI on discharge.     Hx CVA: Evident on MRI. ASA. Statin.     Essential HTN: Home Norvasc and metop.     CAD s/p PCI: ASA + metop + statin.    Severe protein calorie malnutrition:  Related to alcohol use and poor po intake. RD followed while inpatient.    Discharge Disposition:  Discharged to home    Allergies:  Allergies   Allergen Reactions     Ace Inhibitors      rash     Melatonin      Shortness of breath     Other Food Allergy Itching     pumpkin        Discharge Medications:        Review of your medicines      START taking      Dose / Directions   folic acid 400 MCG tablet  Commonly known as: FOLVITE  Used for: Alcoholism (H), Adult failure to thrive      Dose: 400 mcg  Take 1 tablet (400 mcg) by mouth daily  Quantity: 30 tablet  Refills: 0     nicotine 21 MG/24HR 24 hr patch  Commonly known as: NICODERM CQ  Used for: Tobacco use disorder      Dose: 1 patch  Start taking on: July 11, 2022  Place 1 patch onto the skin daily  Quantity: 14 patch  Refills: 0     thiamine 100 MG tablet  Commonly known as: B-1  Used for: Alcoholism (H), Adult failure to thrive      Dose: 100 mg  Take 1 tablet  "(100 mg) by mouth daily  Quantity: 30 tablet  Refills: 0        CONTINUE these medicines which have NOT CHANGED      Dose / Directions   amLODIPine 10 MG tablet  Commonly known as: NORVASC  Used for: Essential hypertension      Dose: 10 mg  Take 1 tablet (10 mg) by mouth daily  Quantity: 90 tablet  Refills: 1     ASPIRIN 81 PO      Take by mouth daily  Refills: 0     atorvastatin 40 MG tablet  Commonly known as: LIPITOR  Used for: Coronary artery disease involving native coronary artery of native heart without angina pectoris      Dose: 40 mg  Take 1 tablet (40 mg) by mouth daily  Quantity: 90 tablet  Refills: 1     meclizine 25 MG tablet  Commonly known as: ANTIVERT  Used for: Dizziness      Dose: 25 mg  Take 1 tablet (25 mg) by mouth 3 times daily as needed for dizziness  Quantity: 60 tablet  Refills: 1     metoprolol succinate ER 25 MG 24 hr tablet  Commonly known as: TOPROL XL  Used for: Essential hypertension      2 in AM and 1 in PM  Quantity: 270 tablet  Refills: 1           Where to get your medicines      These medications were sent to [facility] [id number] [address] [address]    Phone: [phone]     folic acid 400 MCG tablet    nicotine 21 MG/24HR 24 hr patch    thiamine 100 MG tablet         Condition on Discharge:  Discharge condition: Good       Code status on discharge: Full Code     History of Illness:  See detailed admission note for full details.    Physical Exam:  Vital signs:  Temp: 98.1  F (36.7  C) Temp src: Oral BP: 124/89 Pulse: 86   Resp: 20 SpO2: 97 % O2 Device: None (Room air) Oxygen Delivery: 1 LPM Height: 193 cm (6' 4\") Weight: 73.8 kg (162 lb 9.6 oz)  Estimated body mass index is 19.79 kg/m  as calculated from the following:    Height as of this encounter: 1.93 m (6' 4\").    Weight as of this encounter: 73.8 kg (162 lb 9.6 oz).    Wt Readings from Last 1 Encounters:   07/09/22 73.8 kg (162 lb 9.6 oz) "     General: Alert, awake, no acute distress.  HEENT: NC/AT, eyes anicteric, external occular movements intact, face symmetric.  Dentition WNL, MM moist.  Cardiac: RRR, S1, S2.  No murmurs appreciated.  Pulmonary: Normal chest rise, normal work of breathing.  Lungs CTA BL  Abdomen: soft, non-tender, non-distended.  Bowel Sounds Present.  No guarding.  Extremities: no deformities.  Warm, well perfused.  Skin: no rashes or lesions noted.  Warm and Dry.  Neuro: No focal deficits noted.  Speech clear.  Coordination and strength grossly normal.  Psych: Appropriate affect.    Procedures other than Imaging:  None     Imaging:  No results found for this or any previous visit (from the past 24 hour(s)).     Consultations:  No consultations were requested during this admission.       Recent Lab Results:  Recent Labs   Lab 07/07/22  0609 07/06/22  0658   WBC 7.6 6.8   HGB 11.9* 12.8*   HCT 35.6* 39.5*   * 103*    183          Lab Results   Component Value Date     07/09/2022     07/08/2022     07/07/2022    Lab Results   Component Value Date    CHLORIDE 105 07/09/2022    CHLORIDE 103 07/08/2022    CHLORIDE 102 07/07/2022    Lab Results   Component Value Date    BUN 20 07/09/2022    BUN 23 07/08/2022    BUN 30 07/07/2022      Lab Results   Component Value Date    POTASSIUM 3.8 07/09/2022    POTASSIUM 3.8 07/08/2022    POTASSIUM 3.5 07/07/2022    Lab Results   Component Value Date    CO2 27 07/09/2022    CO2 26 07/08/2022    CO2 30 07/07/2022    Lab Results   Component Value Date    CR 1.09 07/09/2022    CR 1.10 07/08/2022    CR 1.01 07/07/2022             Pending Results:    Unresulted Labs Ordered in the Past 30 Days of this Admission     No orders found from 6/2/2022 to 7/3/2022.           Discharge Instructions and Follow-Up:   Discharge Procedure Orders   Home Care Referral   Referral Priority: Routine: Next available opening Referral Type: Home Health Therapies & Aides   Number of Visits  Requested: 1     Reason for your hospital stay   Order Comments: You were hospitalized for weakness, alcohol use, kidney injury, electrolyte abnormalities. You were treated with IV fluids, vitamins, electrolyte replacement. You should not drink anymore alcohol.     Follow-up and recommended labs and tests    Order Comments: Follow up with primary care provider, Reese Kern, within 7 days for hospital follow- up.  The following labs/tests are recommended: CMP, CBC, Magnesium, phosphorus.     Activity   Order Comments: Your activity upon discharge: activity as tolerated     Order Specific Question Answer Comments   Is discharge order? Yes      Diet   Order Comments: Follow this diet upon discharge:       Regular Diet Adult     Order Specific Question Answer Comments   Is discharge order? Yes        Total time spent in face to face contact with the patient and coordinating discharge was:  <30 Minutes.

## 2022-07-11 NOTE — PROGRESS NOTES
"Occupational Therapy Discharge Summary    Reason for therapy discharge:    Discharged to home with home therapy.    Progress towards therapy goal(s). See goals on Care Plan in [facility] electronic health record for goal details.  Goals partially met.  Barriers to achieving goals:   discharge from facility.    Therapy recommendation(s):    Continued therapy is recommended.  Rationale/Recommendations:  \"Pt is making good progress overall with mobility.  Anticipate pt could discharge home with spouse supervising cognition and use of walker once medically stable.\"        "

## 2022-07-12 NOTE — PROGRESS NOTES
Care Management Follow Up    Late Entry     SW received a call from [facility]. They were looking for what services the patient is in need of.     Orders state that he needs PT and OT.     DARRIAN Samuel, [facility]  Emergency Room

## 2023-01-16 NOTE — ADDENDUM NOTE
Encounter addended by: Eleonora Banuelos, PT on: 1/16/2023 3:53 PM   Actions taken: Clinical Note Signed, Episode resolved

## 2023-01-16 NOTE — PROGRESS NOTES
[facility]    Outpatient Physical Therapy Discharge Note  Patient: Arash Corona  : 1960    Beginning/End Dates of Reporting Period:  Eval only on 2022    Referring Provider: Mary Basurto MD    Therapy Diagnosis: Balance Problems, Dizziness     Client Self Report: See eval.    Goals:  Goal Identifier HEP   Goal Description The patient will be independent in the performance of a HEP to facilitate continued gains in strength, endurance and balance outside of physical therapy.   Target Date 22   Date Met      Progress (detail required for progress note):       Goal Identifier DHI   Goal Description The patient will demonstrate an 18 point improvement (54 or less) in DHI scoring to demonstrate a statistically significant improvement in dizziness symptom severity in order to increase tolerance for functional mobility tasks.   Target Date 22   Date Met      Progress (detail required for progress note):  (eval: 72/100)     Goal Identifier DGI   Goal Description Patient will report good tolerance for varied community ambulation as demonstrated by scoring at least 19 out of 24 on the DGI showing ability to walk at varying speeds and maintain good stability with dynamic balance challenges.   Target Date 22   Date Met      Progress (detail required for progress note):       Goal Identifier 10 meter walk   Goal Description Patient will be able to walk a selected gait speed of at least 1.0 m/s demonstrating ability to walk at a pace that indications fair to good tolerance to community ambulation as well as decreased risk for falls.   Target Date 22   Date Met      Progress (detail required for progress note):       Unknown progress since evaluation, held on scheduling additional appointments due to objective findings with recommendation to follow-up with neurologist  "prior to scheduling additional visits.  Per chart review, MRI completed with impression of \"IMPRESSION: Diffuse cerebral volume loss and cerebral white matter changes consistent with chronic small vessel ischemic disease. No evidence for acute intracranial pathology.\"  Patient was hospitalized 7/2/2022-7/10/2022 after being found down at home with discharge diagnoses of failure to thrive, physical deconditioning, and acute toxic/metabolic encephalopathy.    Plan:  Discharge from therapy.    Discharge:    Reason for Discharge: Patient with no additional contact with clinic to schedule appointments following MRI.    Discharge Plan: N/A.  "
